# Patient Record
Sex: FEMALE | Race: WHITE | Employment: FULL TIME | URBAN - METROPOLITAN AREA
[De-identification: names, ages, dates, MRNs, and addresses within clinical notes are randomized per-mention and may not be internally consistent; named-entity substitution may affect disease eponyms.]

---

## 2019-08-12 LAB
CHLAMYDIA, EXTERNAL: NEGATIVE
HBSAG, EXTERNAL: NORMAL
HCT, EXTERNAL: 40.1
HGB, EXTERNAL: 13.8
HIV, EXTERNAL: NORMAL
N. GONORRHEA, EXTERNAL: NEGATIVE
PLATELET CNT,   EXTERNAL: NORMAL
RUBELLA, EXTERNAL: 1.04
T. PALLIDUM, EXTERNAL: NORMAL

## 2019-09-10 LAB — TYPE, ABO & RH, EXTERNAL: NORMAL

## 2019-12-23 LAB — ANTIBODY SCREEN, EXTERNAL: NEGATIVE

## 2020-02-24 LAB — GRBS, EXTERNAL: NEGATIVE

## 2020-03-16 ENCOUNTER — HOSPITAL ENCOUNTER (INPATIENT)
Age: 35
LOS: 3 days | Discharge: HOME | End: 2020-03-19
Attending: OBSTETRICS & GYNECOLOGY | Admitting: OBSTETRICS & GYNECOLOGY
Payer: COMMERCIAL

## 2020-03-16 PROBLEM — Z3A.39 39 WEEKS GESTATION OF PREGNANCY: Status: ACTIVE | Noted: 2020-03-16

## 2020-03-16 LAB
ALBUMIN SERPL-MCNC: 2.8 G/DL (ref 3.5–5)
ALBUMIN/GLOB SERPL: 0.9 {RATIO} (ref 1.1–2.2)
ALP SERPL-CCNC: 89 U/L (ref 45–117)
ALT SERPL-CCNC: 36 U/L (ref 12–78)
ANION GAP SERPL CALC-SCNC: 12 MMOL/L (ref 5–15)
AST SERPL-CCNC: 27 U/L (ref 15–37)
BILIRUB SERPL-MCNC: 0.4 MG/DL (ref 0.2–1)
BUN SERPL-MCNC: 11 MG/DL (ref 6–20)
BUN/CREAT SERPL: 21 (ref 12–20)
CALCIUM SERPL-MCNC: 9 MG/DL (ref 8.5–10.1)
CHLORIDE SERPL-SCNC: 107 MMOL/L (ref 97–108)
CO2 SERPL-SCNC: 20 MMOL/L (ref 21–32)
CREAT SERPL-MCNC: 0.53 MG/DL (ref 0.55–1.02)
ERYTHROCYTE [DISTWIDTH] IN BLOOD BY AUTOMATED COUNT: 12.6 % (ref 11.5–14.5)
GLOBULIN SER CALC-MCNC: 3 G/DL (ref 2–4)
GLUCOSE SERPL-MCNC: 85 MG/DL (ref 65–100)
HCT VFR BLD AUTO: 36 % (ref 35–47)
HGB BLD-MCNC: 12.3 G/DL (ref 11.5–16)
MCH RBC QN AUTO: 32.5 PG (ref 26–34)
MCHC RBC AUTO-ENTMCNC: 34.2 G/DL (ref 30–36.5)
MCV RBC AUTO: 95.2 FL (ref 80–99)
NRBC # BLD: 0 K/UL (ref 0–0.01)
NRBC BLD-RTO: 0 PER 100 WBC
PLATELET # BLD AUTO: 150 K/UL (ref 150–400)
PMV BLD AUTO: 11.1 FL (ref 8.9–12.9)
POTASSIUM SERPL-SCNC: 3.7 MMOL/L (ref 3.5–5.1)
PROT SERPL-MCNC: 5.8 G/DL (ref 6.4–8.2)
RBC # BLD AUTO: 3.78 M/UL (ref 3.8–5.2)
SODIUM SERPL-SCNC: 139 MMOL/L (ref 136–145)
WBC # BLD AUTO: 8.7 K/UL (ref 3.6–11)

## 2020-03-16 PROCEDURE — 74011250637 HC RX REV CODE- 250/637: Performed by: ADVANCED PRACTICE MIDWIFE

## 2020-03-16 PROCEDURE — 75410000002 HC LABOR FEE PER 1 HR

## 2020-03-16 PROCEDURE — 74011250637 HC RX REV CODE- 250/637: Performed by: OBSTETRICS & GYNECOLOGY

## 2020-03-16 PROCEDURE — 85027 COMPLETE CBC AUTOMATED: CPT

## 2020-03-16 PROCEDURE — 36415 COLL VENOUS BLD VENIPUNCTURE: CPT

## 2020-03-16 PROCEDURE — 3E033VJ INTRODUCTION OF OTHER HORMONE INTO PERIPHERAL VEIN, PERCUTANEOUS APPROACH: ICD-10-PCS | Performed by: ADVANCED PRACTICE MIDWIFE

## 2020-03-16 PROCEDURE — 65270000029 HC RM PRIVATE

## 2020-03-16 PROCEDURE — 80053 COMPREHEN METABOLIC PANEL: CPT

## 2020-03-16 RX ORDER — SODIUM CHLORIDE 0.9 % (FLUSH) 0.9 %
5-40 SYRINGE (ML) INJECTION EVERY 8 HOURS
Status: DISCONTINUED | OUTPATIENT
Start: 2020-03-16 | End: 2020-03-17

## 2020-03-16 RX ORDER — OXYTOCIN/0.9 % SODIUM CHLORIDE 30/500 ML
0-25 PLASTIC BAG, INJECTION (ML) INTRAVENOUS
Status: DISCONTINUED | OUTPATIENT
Start: 2020-03-17 | End: 2020-03-17

## 2020-03-16 RX ORDER — BUTORPHANOL TARTRATE 1 MG/ML
1 INJECTION INTRAMUSCULAR; INTRAVENOUS
Status: DISCONTINUED | OUTPATIENT
Start: 2020-03-16 | End: 2020-03-17

## 2020-03-16 RX ORDER — FENTANYL CITRATE 50 UG/ML
25 INJECTION, SOLUTION INTRAMUSCULAR; INTRAVENOUS
Status: DISCONTINUED | OUTPATIENT
Start: 2020-03-16 | End: 2020-03-17

## 2020-03-16 RX ORDER — TERBUTALINE SULFATE 1 MG/ML
0.25 INJECTION SUBCUTANEOUS AS NEEDED
Status: DISCONTINUED | OUTPATIENT
Start: 2020-03-16 | End: 2020-03-17

## 2020-03-16 RX ORDER — SODIUM CHLORIDE 0.9 % (FLUSH) 0.9 %
SYRINGE (ML) INJECTION
Status: COMPLETED
Start: 2020-03-16 | End: 2020-03-16

## 2020-03-16 RX ORDER — ZOLPIDEM TARTRATE 5 MG/1
5 TABLET ORAL
Status: COMPLETED | OUTPATIENT
Start: 2020-03-16 | End: 2020-03-16

## 2020-03-16 RX ORDER — SODIUM CHLORIDE 0.9 % (FLUSH) 0.9 %
5-40 SYRINGE (ML) INJECTION AS NEEDED
Status: DISCONTINUED | OUTPATIENT
Start: 2020-03-16 | End: 2020-03-17

## 2020-03-16 RX ADMIN — MISOPROSTOL 25 MCG: 100 TABLET ORAL at 17:01

## 2020-03-16 RX ADMIN — Medication 10 ML: at 17:44

## 2020-03-16 RX ADMIN — ZOLPIDEM TARTRATE 5 MG: 5 TABLET ORAL at 23:00

## 2020-03-16 RX ADMIN — Medication 10 ML: at 23:01

## 2020-03-16 NOTE — H&P
History & Physical    Name: Dane Flores MRN: [id number]  SSN: [id number]    YOB: 1985  Age: 37 y.o. Sex: female      Subjective:     Estimated Date of Delivery: None noted. OB History    Para Term  AB Living   1             SAB TAB Ectopic Molar Multiple Live Births                    # Outcome Date GA Lbr Moshe/2nd Weight Sex Delivery Anes PTL Lv   1 Current                Ms. Laly Orozco is admitted with pregnancy at 45 4/7 weeks for IOL due to [facility]. Prenatal course has otherwise been uncomplicated. Please see prenatal records for details. She denies HA, visual changes, RUQ pain, ctx, vb, lof. +FM. Past Medical History:   Diagnosis Date    Hypertension      Past Surgical History:   Procedure Laterality Date    HX HERNIA REPAIR       Social History     Occupational History    Not on file   Tobacco Use    Smoking status: Never Smoker   Substance and Sexual Activity    Alcohol use: No     Comment: socially    Drug use: No    Sexual activity: Not on file     No family history on file. No Known Allergies  Prior to Admission medications    Medication Sig Start Date End Date Taking? Authorizing Provider   losartan (COZAAR) 50 mg tablet Take 50 mg by mouth daily. Other, MD Armando        Review of Systems: A comprehensive review of systems was negative except for that written in the History of Present Illness. Objective:     Vitals:  Vitals:    20 1605   BP: (!) 133/98   Pulse: 72   Resp: 18   Temp: 98.6 °F (37 °C)   SpO2: 96%        Physical Exam:  Patient without distress.   Lung: normal respiratory effort  Abdomen: soft, nontender  Fundus: soft and non tender  Perineum: blood absent, amniotic fluid absent  Cervical Exam: Closed/60/-3  Membranes:  Intact  Fetal Heart Rate: Reactive  Baseline: 120 per minute  Variability: moderate  Accelerations: yes  Decelerations: none  Uterine contractions: none          Prenatal Labs:   No results found for: [address] Vel[address]quez, RUBELLAEXT, HBSAGEXT, HIVEXT, RPREXT, GONNOEXT, CHLAMEXT, ABORHEXT, RUBELLAEXT, GRBSEXT, HBSAGEXT, HIVEXT, RPREXT, GONNOEXT, CHLAMEXT    A+  GBS neg    Impression/Plan:     Active Problems:    * No active hospital problems. *     38 y/o  with IUP at 39 2/7 wks, IOL due to [facility]. GBS neg. Plan: Admit for induction of labor. Group B Strep negative. Cervix closed. Discussed recommendation for cytotec for ripening instead and possibility of prolonged IOL. Pt and  in agreement. 25 mcg cytotec was placed vaginally. Plan 25 mcg q4 hours.

## 2020-03-16 NOTE — PROGRESS NOTES
36: 32yo female presents accompanied by her partner for induction of labor. She received prenatal care under the service of [employer]. She is with a history of chronic hypertension but otherwise without significant past medical history. She presents as primigravida at 39+2/7 based on dating from [facility] office records. She has no complaints of pain or discomfort. Reports fetal movement consistent with quickening she has had in her third trimester of pregnancy. Denies rupture of membranes. Denies epigastric pain and vision change. On initial assessment, her skin is warm and dry. Her airway is grossly patent allowing for respirations that are even and unlabored. She was ambulatory to us here today and is alert and oriented to the situation. Skin assessment reveals no bruising, abrasions, or other unexplained abnormalities. Her blood pressure is at the upper-end of WDP but not concerning. She is placed on EFM. 20G IV placed in the left posterior forearm which flushes without issue. 1745: Graduated Compression Stockings applied per provider order. Saline lock flushed. Pt states she would like her  to bring her food this evening. No needs at this time    1900: Pt reports she is now feeling some mild lower vaginal pain. Reassuring tracing on the monitor     2215: Cervical ripening balloon placed  By [facility]. Tolerated well by pt. No obvious signs of complication at this time. Bedside US by CNM prior to procedure confirmed cephalic presentation. Pt tolerated procedure well. Plan is to induce labor at 0400 with pitocin. Will move forward with continuous monitoring until then.     2300: Pt provided with PRN zolpidem for sleep aid. Her and her partner plan to sleep afterward. IV flushed and saline locked. No further needs at this time. Lights dimmed and volume of EFM turned down. Will continue to monitor EFM at central monitoring.      2330: Verbal shift change report given to Susan GONZALEZ L&D(oncoming nurse) by Keith Purdy RN (offgoing nurse). Report included SBAR, Kardex, Intake/Output, MAR, Recent Results and Progress of Care. She accepts the patient to her care at this time. All questions answered.      END NOTE

## 2020-03-17 ENCOUNTER — ANESTHESIA EVENT (OUTPATIENT)
Dept: LABOR AND DELIVERY | Age: 35
End: 2020-03-17
Payer: COMMERCIAL

## 2020-03-17 ENCOUNTER — ANESTHESIA (OUTPATIENT)
Dept: LABOR AND DELIVERY | Age: 35
End: 2020-03-17
Payer: COMMERCIAL

## 2020-03-17 LAB
CREAT UR-MCNC: 19.3 MG/DL
PROT UR-MCNC: <5 MG/DL (ref 0–11.9)
PROT/CREAT UR-RTO: NORMAL

## 2020-03-17 PROCEDURE — 74011250636 HC RX REV CODE- 250/636: Performed by: ANESTHESIOLOGY

## 2020-03-17 PROCEDURE — 75410000000 HC DELIVERY VAGINAL/SINGLE

## 2020-03-17 PROCEDURE — 75410000002 HC LABOR FEE PER 1 HR

## 2020-03-17 PROCEDURE — 84156 ASSAY OF PROTEIN URINE: CPT

## 2020-03-17 PROCEDURE — 0KQM0ZZ REPAIR PERINEUM MUSCLE, OPEN APPROACH: ICD-10-PCS | Performed by: OBSTETRICS & GYNECOLOGY

## 2020-03-17 PROCEDURE — 75410000003 HC RECOV DEL/VAG/CSECN EA 0.5 HR

## 2020-03-17 PROCEDURE — 74011000250 HC RX REV CODE- 250: Performed by: ANESTHESIOLOGY

## 2020-03-17 PROCEDURE — 74011250637 HC RX REV CODE- 250/637: Performed by: OBSTETRICS & GYNECOLOGY

## 2020-03-17 PROCEDURE — 74011250636 HC RX REV CODE- 250/636: Performed by: ADVANCED PRACTICE MIDWIFE

## 2020-03-17 PROCEDURE — 74011250636 HC RX REV CODE- 250/636: Performed by: OBSTETRICS & GYNECOLOGY

## 2020-03-17 PROCEDURE — 65410000002 HC RM PRIVATE OB

## 2020-03-17 PROCEDURE — 77030031139 HC SUT VCRL2 J&J -A

## 2020-03-17 PROCEDURE — 76060000078 HC EPIDURAL ANESTHESIA

## 2020-03-17 PROCEDURE — 00HU33Z INSERTION OF INFUSION DEVICE INTO SPINAL CANAL, PERCUTANEOUS APPROACH: ICD-10-PCS | Performed by: ANESTHESIOLOGY

## 2020-03-17 RX ORDER — SIMETHICONE 80 MG
80 TABLET,CHEWABLE ORAL
Status: DISCONTINUED | OUTPATIENT
Start: 2020-03-17 | End: 2020-03-19 | Stop reason: HOSPADM

## 2020-03-17 RX ORDER — OXYTOCIN/RINGER'S LACTATE 20/1000 ML
999 PLASTIC BAG, INJECTION (ML) INTRAVENOUS AS NEEDED
Status: DISCONTINUED | OUTPATIENT
Start: 2020-03-17 | End: 2020-03-19 | Stop reason: HOSPADM

## 2020-03-17 RX ORDER — FENTANYL CITRATE 50 UG/ML
100 INJECTION, SOLUTION INTRAMUSCULAR; INTRAVENOUS ONCE
Status: COMPLETED | OUTPATIENT
Start: 2020-03-17 | End: 2020-03-17

## 2020-03-17 RX ORDER — BUPIVACAINE HYDROCHLORIDE 2.5 MG/ML
30 INJECTION, SOLUTION EPIDURAL; INFILTRATION; INTRACAUDAL ONCE
Status: DISCONTINUED | OUTPATIENT
Start: 2020-03-17 | End: 2020-03-17

## 2020-03-17 RX ORDER — NALOXONE HYDROCHLORIDE 0.4 MG/ML
0.4 INJECTION, SOLUTION INTRAMUSCULAR; INTRAVENOUS; SUBCUTANEOUS AS NEEDED
Status: DISCONTINUED | OUTPATIENT
Start: 2020-03-17 | End: 2020-03-17

## 2020-03-17 RX ORDER — IBUPROFEN 400 MG/1
800 TABLET ORAL EVERY 8 HOURS
Status: DISCONTINUED | OUTPATIENT
Start: 2020-03-17 | End: 2020-03-19 | Stop reason: HOSPADM

## 2020-03-17 RX ORDER — OXYTOCIN/RINGER'S LACTATE 20/1000 ML
125 PLASTIC BAG, INJECTION (ML) INTRAVENOUS AS NEEDED
Status: DISCONTINUED | OUTPATIENT
Start: 2020-03-17 | End: 2020-03-19 | Stop reason: HOSPADM

## 2020-03-17 RX ORDER — SODIUM CHLORIDE 0.9 % (FLUSH) 0.9 %
5-40 SYRINGE (ML) INJECTION AS NEEDED
Status: DISCONTINUED | OUTPATIENT
Start: 2020-03-17 | End: 2020-03-19 | Stop reason: HOSPADM

## 2020-03-17 RX ORDER — HYDROCORTISONE ACETATE PRAMOXINE HCL 2.5; 1 G/100G; G/100G
CREAM TOPICAL AS NEEDED
Status: DISCONTINUED | OUTPATIENT
Start: 2020-03-17 | End: 2020-03-19 | Stop reason: HOSPADM

## 2020-03-17 RX ORDER — SODIUM CHLORIDE, SODIUM LACTATE, POTASSIUM CHLORIDE, CALCIUM CHLORIDE 600; 310; 30; 20 MG/100ML; MG/100ML; MG/100ML; MG/100ML
125 INJECTION, SOLUTION INTRAVENOUS CONTINUOUS
Status: DISCONTINUED | OUTPATIENT
Start: 2020-03-17 | End: 2020-03-17

## 2020-03-17 RX ORDER — ZOLPIDEM TARTRATE 5 MG/1
5 TABLET ORAL
Status: DISCONTINUED | OUTPATIENT
Start: 2020-03-17 | End: 2020-03-19 | Stop reason: HOSPADM

## 2020-03-17 RX ORDER — AMMONIA 15 % (W/V)
1 AMPUL (EA) INHALATION AS NEEDED
Status: DISCONTINUED | OUTPATIENT
Start: 2020-03-17 | End: 2020-03-19 | Stop reason: HOSPADM

## 2020-03-17 RX ORDER — ACETAMINOPHEN 325 MG/1
650 TABLET ORAL
Status: DISCONTINUED | OUTPATIENT
Start: 2020-03-17 | End: 2020-03-19 | Stop reason: HOSPADM

## 2020-03-17 RX ORDER — FENTANYL CITRATE 50 UG/ML
INJECTION, SOLUTION INTRAMUSCULAR; INTRAVENOUS AS NEEDED
Status: DISCONTINUED | OUTPATIENT
Start: 2020-03-17 | End: 2020-03-17 | Stop reason: HOSPADM

## 2020-03-17 RX ORDER — BUPIVACAINE HYDROCHLORIDE 2.5 MG/ML
INJECTION, SOLUTION EPIDURAL; INFILTRATION; INTRACAUDAL AS NEEDED
Status: DISCONTINUED | OUTPATIENT
Start: 2020-03-17 | End: 2020-03-17 | Stop reason: HOSPADM

## 2020-03-17 RX ORDER — DOCUSATE SODIUM 100 MG/1
100 CAPSULE, LIQUID FILLED ORAL
Status: DISCONTINUED | OUTPATIENT
Start: 2020-03-17 | End: 2020-03-19 | Stop reason: HOSPADM

## 2020-03-17 RX ORDER — OXYCODONE AND ACETAMINOPHEN 5; 325 MG/1; MG/1
1 TABLET ORAL
Status: DISCONTINUED | OUTPATIENT
Start: 2020-03-17 | End: 2020-03-19 | Stop reason: HOSPADM

## 2020-03-17 RX ORDER — FENTANYL/BUPIVACAINE/NS/PF 2-1250MCG
10 PREFILLED PUMP RESERVOIR EPIDURAL CONTINUOUS
Status: DISCONTINUED | OUTPATIENT
Start: 2020-03-17 | End: 2020-03-17

## 2020-03-17 RX ORDER — EPHEDRINE SULFATE/0.9% NACL/PF 50 MG/5 ML
10 SYRINGE (ML) INTRAVENOUS
Status: DISCONTINUED | OUTPATIENT
Start: 2020-03-17 | End: 2020-03-17

## 2020-03-17 RX ORDER — HYDROCORTISONE 1 %
CREAM (GRAM) TOPICAL AS NEEDED
Status: DISCONTINUED | OUTPATIENT
Start: 2020-03-17 | End: 2020-03-19 | Stop reason: HOSPADM

## 2020-03-17 RX ORDER — SODIUM CHLORIDE 0.9 % (FLUSH) 0.9 %
5-40 SYRINGE (ML) INJECTION EVERY 8 HOURS
Status: DISCONTINUED | OUTPATIENT
Start: 2020-03-17 | End: 2020-03-19 | Stop reason: HOSPADM

## 2020-03-17 RX ADMIN — SODIUM CHLORIDE, SODIUM LACTATE, POTASSIUM CHLORIDE, AND CALCIUM CHLORIDE 125 ML/HR: 600; 310; 30; 20 INJECTION, SOLUTION INTRAVENOUS at 04:39

## 2020-03-17 RX ADMIN — BUTORPHANOL TARTRATE 1 MG: 1 INJECTION, SOLUTION INTRAMUSCULAR; INTRAVENOUS at 12:41

## 2020-03-17 RX ADMIN — FENTANYL CITRATE 100 MCG: 50 INJECTION INTRAMUSCULAR; INTRAVENOUS at 14:27

## 2020-03-17 RX ADMIN — BUPIVACAINE HYDROCHLORIDE 5 ML: 2.5 INJECTION, SOLUTION EPIDURAL; INFILTRATION; INTRACAUDAL; PERINEURAL at 14:12

## 2020-03-17 RX ADMIN — BUTORPHANOL TARTRATE 1 MG: 1 INJECTION, SOLUTION INTRAMUSCULAR; INTRAVENOUS at 01:17

## 2020-03-17 RX ADMIN — BUPIVACAINE HYDROCHLORIDE 5 ML: 2.5 INJECTION, SOLUTION EPIDURAL; INFILTRATION; INTRACAUDAL; PERINEURAL at 14:14

## 2020-03-17 RX ADMIN — FENTANYL CITRATE 100 MCG: 50 INJECTION, SOLUTION INTRAMUSCULAR; INTRAVENOUS at 14:14

## 2020-03-17 RX ADMIN — BUTORPHANOL TARTRATE 1 MG: 1 INJECTION, SOLUTION INTRAMUSCULAR; INTRAVENOUS at 08:53

## 2020-03-17 RX ADMIN — IBUPROFEN 800 MG: 400 TABLET, FILM COATED ORAL at 21:26

## 2020-03-17 RX ADMIN — Medication 10 ML/HR: at 14:21

## 2020-03-17 RX ADMIN — OXYTOCIN-SODIUM CHLORIDE 0.9% IV SOLN 30 UNIT/500ML 1 MILLI-UNITS/MIN: 30-0.9/5 SOLUTION at 04:41

## 2020-03-17 NOTE — PROGRESS NOTES
[id number] Lunch coverage and transfer of care to ROB Thomson RN after [facility] [employer].

## 2020-03-17 NOTE — ANESTHESIA PREPROCEDURE EVALUATION
Relevant Problems   No relevant active problems       Anesthetic History   No history of anesthetic complications            Review of Systems / Medical History  Patient summary reviewed, nursing notes reviewed and pertinent labs reviewed    Pulmonary  Within defined limits                 Neuro/Psych   Within defined limits           Cardiovascular    Hypertension                   GI/Hepatic/Renal  Within defined limits              Endo/Other  Within defined limits           Other Findings              Physical Exam    Airway  Mallampati: II  TM Distance: > 6 cm  Neck ROM: normal range of motion   Mouth opening: Normal     Cardiovascular  Regular rate and rhythm,  S1 and S2 normal,  no murmur, click, rub, or gallop             Dental  No notable dental hx       Pulmonary  Breath sounds clear to auscultation               Abdominal  GI exam deferred       Other Findings            Anesthetic Plan    ASA: 2  Anesthesia type: epidural            Anesthetic plan and risks discussed with: Patient

## 2020-03-17 NOTE — PROGRESS NOTES
Problem: Patient Education: Go to Patient Education Activity  Goal: Patient/Family Education  Outcome: Progressing Towards Goal     Problem: Vaginal Delivery: Day of Deliver-Laboring  Goal: Off Pathway (Use only if patient is Off Pathway)  Outcome: Progressing Towards Goal  Goal: Activity/Safety  Outcome: Progressing Towards Goal  Goal: Consults, if ordered  Outcome: Progressing Towards Goal  Goal: Diagnostic Test/Procedures  Outcome: Progressing Towards Goal  Goal: Nutrition/Diet  Outcome: Progressing Towards Goal  Goal: Discharge Planning  Outcome: Progressing Towards Goal  Goal: Medications  Outcome: Progressing Towards Goal  Goal: Respiratory  Outcome: Progressing Towards Goal  Goal: Treatments/Interventions/Procedures  Outcome: Progressing Towards Goal  Goal: *Vital signs within defined limits  Outcome: Progressing Towards Goal  Goal: *Labs within defined limits  Outcome: Progressing Towards Goal  Goal: *Hemodynamically stable  Outcome: Progressing Towards Goal  Goal: *Optimal pain control at patient's stated goal  Outcome: Progressing Towards Goal     Problem: Vaginal Delivery: Day of Delivery-Post delivery  Goal: Off Pathway (Use only if patient is Off Pathway)  Outcome: Progressing Towards Goal  Goal: Activity/Safety  Outcome: Progressing Towards Goal  Goal: Consults, if ordered  Outcome: Progressing Towards Goal  Goal: Nutrition/Diet  Outcome: Progressing Towards Goal  Goal: Discharge Planning  Outcome: Progressing Towards Goal  Goal: Medications  Outcome: Progressing Towards Goal  Goal: Treatments/Interventions/Procedures  Outcome: Progressing Towards Goal  Goal: *Vital signs within defined limits  Outcome: Progressing Towards Goal  Goal: *Labs within defined limits  Outcome: Progressing Towards Goal  Goal: *Hemodynamically stable  Outcome: Progressing Towards Goal  Goal: *Optimal pain control at patient's stated goal  Outcome: Progressing Towards Goal  Goal: *Participates in infant care  Outcome: Progressing Towards Goal  Goal: *Demonstrates progressive activity  Outcome: Progressing Towards Goal  Goal: *Tolerating diet  Outcome: Progressing Towards Goal     Problem: Vaginal Delivery: Postpartum Day 1  Goal: Off Pathway (Use only if patient is Off Pathway)  Outcome: Progressing Towards Goal  Goal: Activity/Safety  Outcome: Progressing Towards Goal  Goal: Consults, if ordered  Outcome: Progressing Towards Goal  Goal: Diagnostic Test/Procedures  Outcome: Progressing Towards Goal  Goal: Nutrition/Diet  Outcome: Progressing Towards Goal  Goal: Discharge Planning  Outcome: Progressing Towards Goal  Goal: Medications  Outcome: Progressing Towards Goal  Goal: Treatments/Interventions/Procedures  Outcome: Progressing Towards Goal  Goal: Psychosocial  Outcome: Progressing Towards Goal  Goal: *Vital signs within defined limits  Outcome: Progressing Towards Goal  Goal: *Labs within defined limits  Outcome: Progressing Towards Goal  Goal: *Hemodynamically stable  Outcome: Progressing Towards Goal  Goal: *Optimal pain control at patient's stated goal  Outcome: Progressing Towards Goal  Goal: *Participates in infant care  Outcome: Progressing Towards Goal  Goal: *Demonstrates progressive activity  Outcome: Progressing Towards Goal  Goal: *Performs self perineal care  Outcome: Progressing Towards Goal  Goal: *Appropriate parent-infant bonding  Outcome: Progressing Towards Goal  Goal: *Tolerating diet  Outcome: Progressing Towards Goal  Goal: *Performs self breast care  Outcome: Progressing Towards Goal     Problem: Vaginal Delivery: Postpartum 2  Goal: Off Pathway (Use only if patient is Off Pathway)  Outcome: Progressing Towards Goal  Goal: Activity/Safety  Outcome: Progressing Towards Goal  Goal: Consults, if ordered  Outcome: Progressing Towards Goal  Goal: Nutrition/Diet  Outcome: Progressing Towards Goal  Goal: Discharge Planning  Outcome: Progressing Towards Goal  Goal: Medications  Outcome: Progressing Towards Goal  Goal: Treatments/Interventions/Procedures  Outcome: Progressing Towards Goal  Goal: Psychosocial  Outcome: Progressing Towards Goal     Problem: Vaginal Delivery: Discharge Outcomes  Goal: *Verbalizes name, dosage, time, side effects, and number of days to continue medications  Outcome: Progressing Towards Goal  Goal: *Describes available resources and support systems  Outcome: Progressing Towards Goal  Goal: *No signs and symptoms of infection  Outcome: Progressing Towards Goal  Goal: *Birth certificate information completed  Outcome: Progressing Towards Goal  Goal: *Received and verbalizes understanding of discharge plan and instructions  Outcome: Progressing Towards Goal  Goal: *Vital signs within defined limits  Outcome: Progressing Towards Goal  Goal: *Labs within defined limits  Outcome: Progressing Towards Goal  Goal: *Hemodynamically stable  Outcome: Progressing Towards Goal  Goal: *Optimal pain control at patient's stated goal  Outcome: Progressing Towards Goal  Goal: *Participates in infant care  Outcome: Progressing Towards Goal  Goal: *Demonstrates progressive activity  Outcome: Progressing Towards Goal  Goal: *Appropriate parent-infant bonding  Outcome: Progressing Towards Goal  Goal: *Tolerating diet  Outcome: Progressing Towards Goal     Problem: Falls - Risk of  Goal: *Absence of Falls  Description: Document Jerome Fall Risk and appropriate interventions in the flowsheet.   Outcome: Progressing Towards Goal  Note: Fall Risk Interventions:            Medication Interventions: Patient to call before getting OOB                   Problem: Patient Education: Go to Patient Education Activity  Goal: Patient/Family Education  Outcome: Progressing Towards Goal

## 2020-03-17 NOTE — PROGRESS NOTES
In room to see patient. Feeling some CTX. Friedman still in. Visit Vitals  BP (!) 129/98   Pulse 71   Temp 98.6 °F (37 °C)   Resp 16   Ht 5' 4\" (1.626 m) Comment: per pt   Wt 81.6 kg (180 lb) Comment: per pt   SpO2 99%   BMI 30.90 kg/m²     SVE: 2+cm with friedman still in place. EFM: Cat 1  Daph[place]: q2 mins    A/P: Pt is a 36 yo G1 at 39+3 for IOL for CHTN. Mild range BPs -- will send P:C  Reassess 10 am for friedman -- consider traction at that time. AROM when feasible. Epidural PRN. Anticipate .

## 2020-03-17 NOTE — PROGRESS NOTES
Bedside shift change report given to ROB Herman RN (oncoming nurse) by Butch Bansal RN (offgoing nurse). Report included the following information SBAR, Kardex, Intake/Output and MAR. 1045 Dr Yamel Horowitz in to assess pt progress, friedman balloon close to coming out, extra traction put on balloon at this time. 1838 pt began pushing at this time, nurse continuously at bedside. Fetal heart tracing reassuring and reactive. 1900 nurse continuously at bedside. Fetal heart tracing reassuring and reactive. 1930 nurse continuously at bedside. Fetal heart tracing reassuring and reactive. 1940 Bedside shift change report given to JUNIOR Benavides (oncoming nurse) by ROB Zarate RN (offgoing nurse). Report included the following information SBAR, Kardex and MAR.

## 2020-03-17 NOTE — ANESTHESIA PROCEDURE NOTES
Epidural Block    Start time: 3/17/2020 2:04 PM  End time: 3/17/2020 2:15 PM  Performed by: Aroldo Littlejohn MD  Authorized by: Aroldo Littlejohn MD     Pre-Procedure  Indication: labor epidural    Preanesthetic Checklist: patient identified, risks and benefits discussed, anesthesia consent, site marked, patient being monitored, timeout performed and anesthesia consent    Timeout Time: 14:04        Epidural:   Patient position:  Seated  Prep region:  Lumbar  Prep: Betadine and Patient draped    Location:  L2-3    Needle and Epidural Catheter:   Needle Type:  Tuohy  Needle Gauge:  17 G  Injection Technique:  Loss of resistance using air  Attempts:  1  Catheter Size:  19 G  Events: no blood with aspiration, no cerebrospinal fluid with aspiration, no paresthesia and negative aspiration test    Test Dose:  Bupivacaine 0.25% and negative    Assessment:   Catheter Secured:  Tegaderm and tape  Insertion:  Uncomplicated  Patient tolerance:  Patient tolerated the procedure well with no immediate complications

## 2020-03-17 NOTE — PROGRESS NOTES
In room to see patient. FB just came out. Visit Vitals  BP (!) 138/93   Pulse 73   Temp 98.3 °F (36.8 °C)   Resp 16   Ht 5' 4\" (1.626 m) Comment: per pt   Wt 81.6 kg (180 lb) Comment: per pt   SpO2 99%   BMI 30.90 kg/m²     SVE: 5-6/60/-3, AROM clear fluid. EFM: Cat 1  [place]: q2-3 mins    A/P: Pt is a 36 yo G1 at 39+3 wga for IOL for CHTN  BPs mild range, P:C pending, pre-e labs WNL otherwise. Epidural PRN  Anticipate .

## 2020-03-17 NOTE — PROGRESS NOTES
Labor Progress Note    S: Patient seen, fetal heart rate and contraction pattern evaluated. Resting in bed. Feeling some mild cramping. Partner at bedside.     Physical Exam:  Patient Vitals for the past 4 hrs:   Temp Pulse Resp BP SpO2   20 98.6 °F (37 °C) 78 18 141/89 99 %         Cervical Exam: fingertip/70/-3, vtx confirmed by bedside sono  Cook balloon placed 60mL (uterine), 60mL (vaginal)  Membranes:  Intact  Uterine Contractions:  Frequency: Irregular q1-6, mild, not all felt by patient  Fetal Heart Rate: Reactive, 135s, +accels, neg decels, moderate variability      Assessment/Plan:    37 y.o.  at 39w2d IUP  IOL CHTN  S/p cytotec x1 dose  Cat 1 tracing  GBS negative    P:  Assumed care of patient at   Introduced self to patient and partner  Reviewed cook balloon with patient and partner including r/b/a and agree to proceed  Continuous monitoring  Start pitocin at 0400  All questions asked/answered and patient agrees with plan    Sylvia Foley CNM

## 2020-03-18 PROCEDURE — 74011250637 HC RX REV CODE- 250/637: Performed by: OBSTETRICS & GYNECOLOGY

## 2020-03-18 PROCEDURE — 65410000002 HC RM PRIVATE OB

## 2020-03-18 RX ORDER — IBUPROFEN 600 MG/1
600 TABLET ORAL
Qty: 40 TAB | Refills: 0 | Status: SHIPPED | OUTPATIENT
Start: 2020-03-18 | End: 2021-09-30

## 2020-03-18 RX ADMIN — ACETAMINOPHEN 650 MG: 325 TABLET ORAL at 02:22

## 2020-03-18 RX ADMIN — ACETAMINOPHEN 650 MG: 325 TABLET ORAL at 09:45

## 2020-03-18 RX ADMIN — DOCUSATE SODIUM 100 MG: 100 CAPSULE, LIQUID FILLED ORAL at 02:22

## 2020-03-18 RX ADMIN — IBUPROFEN 800 MG: 400 TABLET, FILM COATED ORAL at 14:59

## 2020-03-18 RX ADMIN — IBUPROFEN 800 MG: 400 TABLET, FILM COATED ORAL at 06:07

## 2020-03-18 NOTE — PROGRESS NOTES
Post-Partum Day Number 1 Progress Note    Princess Santillan     Assessment: Doing well, post partum day 1    Plan:  1. Continue routine postpartum and perineal care as well as maternal education. 2. CHTN: normotensive, no meds  3. Female , doing well    Information for the patient's :  Michelle Poster [[id number]]   Vaginal, Spontaneous     Subjective:  Patient doing well without significant complaint. Voiding without difficulty, normal lochia. Vitals:  Visit Vitals  /89 (BP 1 Location: Right arm, BP Patient Position: At rest)   Pulse 94   Temp 98.7 °F (37.1 °C)   Resp 16   Ht 5' 4\" (1.626 m) Comment: per pt   Wt 81.6 kg (180 lb) Comment: per pt   SpO2 98%   Breastfeeding Unknown   BMI 30.90 kg/m²     Temp (24hrs), Av.5 °F (36.9 °C), Min:98 °F (36.7 °C), Max:99.1 °F (37.3 °C)        Exam:   Patient without distress. Abdomen soft, fundus firm, nontender                Perineum with normal lochia noted. Lower extremities are negative for swelling, cords or tenderness. Labs:     Lab Results   Component Value Date/Time    WBC 8.7 2020 04:43 PM    WBC 6.2 2014 11:15 AM    WBC 7.4 2011 06:50 PM    HGB 12.3 2020 04:43 PM    HGB 14.2 2014 11:15 AM    HGB 14.1 2011 06:50 PM    HCT 36.0 2020 04:43 PM    HCT 41.2 2014 11:15 AM    HCT 40.4 2011 06:50 PM    PLATELET 833  04:43 PM    PLATELET 623  11:15 AM    PLATELET 917  06:50 PM    Hgb, External 13.8 2019    Hct, External 40.1 2019    Platelet cnt., External 211,000 2019       No results found for this or any previous visit (from the past 24 hour(s)).

## 2020-03-18 NOTE — DISCHARGE SUMMARY
Obstetrical Discharge Summary     Name: Antonio García MRN: [id number]  SSN: [id number]    YOB: 1985  Age: 37 y.o. Sex: female      Admit Date: 3/16/2020    Discharge Date: 3/19/2020     Admitting Physician: Nadine Mcrae MD     Attending Physician:  Edna Leiva MD     Admission Diagnoses: 39 weeks gestation of pregnancy [Z3A.39]    Discharge Diagnoses:   Information for the patient's :  Drake Dorman [[id number]]   Delivery of a 3.495 kg female infant via Vaginal, Spontaneous on 3/17/2020 at 7:38 PM  by Ligia Nance. Apgars were 9  and 9 . Additional Diagnoses:   Hospital Problems  Never Reviewed          Codes Class Noted POA    39 weeks gestation of pregnancy ICD-10-CM: Z3A.39  ICD-9-CM: V22.2  3/16/2020 Unknown             Lab Results   Component Value Date/Time    Rubella, External 1.04 2019    GrBStrep, External Negative 2020       Hospital Course: Normal hospital course following the delivery. Disposition at Discharge: Home or self care    Discharged Condition: Stable    Patient Instructions:   Current Discharge Medication List      START taking these medications    Details   ibuprofen (MOTRIN) 600 mg tablet Take 1 Tab by mouth every six (6) hours as needed for Pain. Qty: 40 Tab, Refills: 0         STOP taking these medications       losartan (COZAAR) 50 mg tablet Comments:   Reason for Stopping:               Reference my discharge instructions.     Follow-up Appointments   Procedures    FOLLOW UP VISIT Appointment in: 6 Weeks Please also check BP in one week     Please also check BP in one week     Standing Status:   Standing     Number of Occurrences:   1     Order Specific Question:   Appointment in     Answer:   6 Weeks        Signed By:  Nikita Borja MD     2020

## 2020-03-18 NOTE — ADVANCED PRACTICE NURSE
TRANSFER - IN REPORT:    Verbal report received from Rosetta RN(name) on Poppy Grover  being received from L&D(unit) for routine progression of care      Report consisted of patients Situation, Background, Assessment and   Recommendations(SBAR). Information from the following report(s) SBAR, Kardex, Procedure Summary, Intake/Output, MAR and Recent Results was reviewed with the receiving nurse. Opportunity for questions and clarification was provided. Assessment completed upon patients arrival to unit and care assumed.

## 2020-03-18 NOTE — PROGRESS NOTES
Delivery Summary  Patient: Ruperto Ulloa             Circumcision:   NA-female  Additional Delivery Comments - IOL for CHTN, uncomplicated . 2nd degree laceration repaired without issue, left labial not repaired. Information for the patient's :  Paul Mobley [[id number]]     Delivery Type: Vaginal, Spontaneous   Delivery Date: 3/17/2020   Delivery Time: 7:38 PM     Birth Weight:       Sex:  female  Delivery Clinician:  Keith Block   Gestational Age: 38w3d    Presentation: Vertex   Position:             Apgars were 9  and 9      Resuscitation Method: None     Meconium Stained: None    Living Status: Living       Placenta Date/Time: 3/17/2020  7:43 PM   Placenta Removal: Spontaneous   Placenta Appearance: Normal    Cord Information: 3 Vessels    Cord Events: Nuchal Cord Without Compressions       Disposition of Cord Blood: Discard    Blood Gases Sent?:  No       Cord pH:  none    Episiotomy:     Laceration(s):     2nd degree  Estimated Blood Loss (ml): 350mL    Labor Events  Method: Oxytocin      Augmentation:     Cervical Ripening:        [place]/EASI        Operative Vaginal Delivery - none

## 2020-03-18 NOTE — DISCHARGE INSTRUCTIONS
Patient Education        Vaginal Childbirth: Care Instructions  Your Care Instructions    Your body will slowly heal in the next few weeks. It is easy to get too tired and overwhelmed during the first weeks after your baby is born. Changes in your hormones can shift your mood without warning. You may find it hard to meet the extra demands on your energy and time. Take it easy on yourself. Follow-up care is a key part of your treatment and safety. Be sure to make and go to all appointments, and call your doctor if you are having problems. It's also a good idea to know your test results and keep a list of the medicines you take. How can you care for yourself at home? · Vaginal bleeding and cramps  ? After delivery, you will have a bloody discharge from the vagina. This will turn pink within a week and then white or yellow after about 10 days. It may last for 2 to 4 weeks or longer, until the uterus has healed. Use pads instead of tampons until you stop bleeding. ? Do not worry if you pass some blood clots, as long as they are smaller than a golf ball. If you have a tear or stitches in your vaginal area, change the pad at least every 4 hours to prevent soreness and infection. ? You may have cramps for the first few days after childbirth. These are normal and occur as the uterus shrinks to normal size. Take an over-the-counter pain medicine, such as acetaminophen (Tylenol), ibuprofen (Advil, Motrin), or naproxen (Aleve), for cramps. Read and follow all instructions on the label. Do not take aspirin, because it can cause more bleeding. ? Do not take two or more pain medicines at the same time unless the doctor told you to. Many pain medicines have acetaminophen, which is Tylenol. Too much acetaminophen (Tylenol) can be harmful. · Stitches  ? If you have stitches, they will dissolve on their own and do not need to be removed. Follow your doctor's instructions for cleaning the stitched area.   ? Put ice or a cold pack on your painful area for 10 to 20 minutes at a time, several times a day, for the first few days. Put a thin cloth between the ice and your skin. ? Sit in a few inches of warm water (sitz bath) 3 times a day and after bowel movements. The warm water helps with pain and itching. If you do not have a tub, a warm shower might help. · Breast fullness  ? Your breasts may overfill (engorge) in the first few days after delivery. To help milk flow and to relieve pain, warm your breasts in the shower or by using warm, moist towels before nursing. ? If you are not nursing, do not put warmth on your breasts or touch your breasts. Wear a tight bra or sports bra and use ice until the fullness goes away. This usually takes 2 to 3 days. ? Put ice or a cold pack on your breast after nursing to reduce swelling and pain. Put a thin cloth between the ice and your skin. · Activity  ? Eat a balanced diet. Do not try to lose weight by cutting calories. Keep taking your prenatal vitamins, or take a multivitamin. ? Get as much rest as you can. Try to take naps when your baby sleeps during the day. ? Get some exercise every day. But do not do any heavy exercise until your doctor says it is okay. ? Wait until you are healed (about 4 to 6 weeks) before you have sexual intercourse. Your doctor will tell you when it is okay to have sex. ? Talk to your doctor about birth control. You can get pregnant even before your period returns. Also, you can get pregnant while you are breastfeeding. · Mental health  ? It is normal to have some sadness, anxiety, sleeplessness, and mood swings after you go home. If you feel upset or hopeless for more than a few days or are having trouble doing the things you need to do, talk to your doctor. · Constipation and hemorrhoids  ? Drink plenty of fluids, enough so that your urine is light yellow or clear like water.  If you have kidney, heart, or liver disease and have to limit fluids, talk with your doctor before you increase the amount of fluids you drink. ? Eat plenty of fiber each day. Have a bran muffin or bran cereal for breakfast, and try eating a piece of fruit for a mid-afternoon snack. ? For painful, itchy hemorrhoids, put ice or a cold pack on the area several times a day for 10 minutes at a time. Follow this by putting a warm compress on the area for another 10 to 20 minutes or by sitting in a shallow, warm bath. When should you call for help? Call  911 anytime you think you may need emergency care. For example, call if:    · You have thoughts of harming yourself, your baby, or another person.     · You passed out (lost consciousness).     · You have chest pain, are short of breath, or cough up blood.     · You have a seizure.    Call your doctor now or seek immediate medical care if:    · You have severe vaginal bleeding.     · You are dizzy or lightheaded, or you feel like you may faint.     · You have a fever.     · You have new or more pain in your belly or pelvis.     · You have symptoms of a blood clot in your leg (called a deep vein thrombosis), such as:  ? Pain in the calf, back of the knee, thigh, or groin. ? Redness and swelling in your leg or groin.     · You have signs of preeclampsia, such as:  ? Sudden swelling of your face, hands, or feet. ? New vision problems (such as dimness, blurring, or seeing spots). ? A severe headache.    Watch closely for changes in your health, and be sure to contact your doctor if:    · Your vaginal bleeding seems to be getting heavier.     · You have new or worse vaginal discharge.     · You feel sad, anxious, or hopeless for more than a few days.     · You do not get better as expected. Where can you learn more? Go to http://jo-ann-mann.info/  Enter Q237 in the search box to learn more about \"Vaginal Childbirth: Care Instructions. \"  Current as of: May 29, 2019Content Version: 12.4  © 1560-1660 [employer].   Care instructions adapted under license by [employer] (which disclaims liability or warranty for this information). If you have questions about a medical condition or this instruction, always ask your healthcare professional. [address] any warranty or liability for your use of this information.

## 2020-03-18 NOTE — LACTATION NOTE
This note was copied from a baby's chart. Baby nursing well today with use of nipple shield to inverted nipples,  deep latch obtained, mother is comfortable, baby feeding vigorously with rhythmic suck, swallow, breathe pattern, audible swallowing, and evident milk transfer, both breasts offered, baby is asleep following feeding. Infant was fussy at beginning of consult, once baby got a taste, she settled and nursed well. Mother given hand pump to aid with pulling out nipples.

## 2020-03-18 NOTE — PROGRESS NOTES
Bedside and Verbal shift change report given to Jo-Ann Sheridan RN (oncoming nurse) by Eliud Lake RN (offgoing nurse). Report included the following information SBAR, Kardex, Intake/Output, MAR and Recent Results. :   vigorous female  placed on mom's chest.   :  TRANSFER - OUT REPORT:    Verbal report given to JOSAFAT Gipson RN(name) on [place]  being transferred to MIU(unit) for ordered procedure       Report consisted of patients Situation, Background, Assessment and   Recommendations(SBAR). Information from the following report(s) SBAR, Kardex, Intake/Output, MAR and Recent Results was reviewed with the receiving nurse. Lines:   Peripheral IV 20 Anterior; Left Forearm (Active)   Site Assessment Clean, dry, & intact 3/17/2020  7:58 PM   Phlebitis Assessment 0 3/17/2020  7:58 PM   Infiltration Assessment 0 3/17/2020  7:58 PM   Dressing Status Clean, dry, & intact 3/17/2020  7:58 PM   Dressing Type Tape;Transparent 3/17/2020  7:58 PM   Hub Color/Line Status Pink; Infusing 3/17/2020  7:58 PM   Alcohol Cap Used Yes 3/16/2020 11:11 PM        Opportunity for questions and clarification was provided.       Patient transported with:   Registered Nurse

## 2020-03-19 VITALS
SYSTOLIC BLOOD PRESSURE: 137 MMHG | WEIGHT: 180 LBS | HEIGHT: 64 IN | DIASTOLIC BLOOD PRESSURE: 86 MMHG | RESPIRATION RATE: 16 BRPM | TEMPERATURE: 97.7 F | HEART RATE: 84 BPM | BODY MASS INDEX: 30.73 KG/M2 | OXYGEN SATURATION: 98 %

## 2020-03-19 PROCEDURE — 74011250637 HC RX REV CODE- 250/637: Performed by: OBSTETRICS & GYNECOLOGY

## 2020-03-19 RX ADMIN — ACETAMINOPHEN 650 MG: 325 TABLET ORAL at 10:37

## 2020-03-19 RX ADMIN — ACETAMINOPHEN 650 MG: 325 TABLET ORAL at 05:07

## 2020-03-19 RX ADMIN — DOCUSATE SODIUM 100 MG: 100 CAPSULE, LIQUID FILLED ORAL at 05:07

## 2020-03-19 NOTE — PROGRESS NOTES
Bedside and Verbal shift change report given to DEXTER Bowles RN (oncoming nurse) by Matthew otriz.  Report included the following information SBAR, Kardex, Intake/Output, MAR and Recent Results.

## 2020-03-19 NOTE — PROGRESS NOTES
Post-Partum Day Number 2 Progress Note    Earle Santillan     Assessment: Doing well, post partum day 2    Plan:   1. Discharge home today  2. Follow up in office in 6 weeks with Edna Leiva MD  3. Post partum activity advised, diet as tolerated  4. Discharge Medications: pain medicines as taken in hospital, and medications prior to admission    CHTN- normal to mild range BPs  - pt will check BPs at home - aware to call for . 160/110    Information for the patient's :  Drake Dorman [[id number]]   Vaginal, Spontaneous   Patient doing well without significant complaint. Voiding without difficulty, normal lochia. Vitals:  Visit Vitals  /90 (BP 1 Location: Right arm, BP Patient Position: At rest)   Pulse 73   Temp 98.3 °F (36.8 °C)   Resp 16   Ht 5' 4\" (1.626 m) Comment: per pt   Wt 81.6 kg (180 lb) Comment: per pt   SpO2 98%   Breastfeeding Unknown   BMI 30.90 kg/m²     Temp (24hrs), Av.4 °F (36.9 °C), Min:98.1 °F (36.7 °C), Max:98.8 °F (37.1 °C)      Exam:         Patient without distress. Abdomen soft, fundus firm, nontender                 Lower extremities are symmetric without tenderness, cords or erythema. Labs:     Lab Results   Component Value Date/Time    WBC 8.7 2020 04:43 PM    WBC 6.2 2014 11:15 AM    WBC 7.4 2011 06:50 PM    HGB 12.3 2020 04:43 PM    HGB 14.2 2014 11:15 AM    HGB 14.1 2011 06:50 PM    HCT 36.0 2020 04:43 PM    HCT 41.2 2014 11:15 AM    HCT 40.4 2011 06:50 PM    PLATELET 578  04:43 PM    PLATELET 155  11:15 AM    PLATELET 042  06:50 PM    Hgb, External 13.8 2019    Hct, External 40.1 2019    Platelet cnt., External 211,000 2019       No results found for this or any previous visit (from the past 24 hour(s)).

## 2020-03-19 NOTE — ROUTINE PROCESS
Bedside shift change report given to A Stephen RN (oncoming nurse) by Malathi Morrow RN (offgoing nurse). Report included the following information SBAR.   0913- Discussed with the patient and all questioned fully answered. Esign pad not working, Patient has received and understands all discharge instructions.

## 2021-02-04 LAB
CHLAMYDIA, EXTERNAL: NEGATIVE
N. GONORRHEA, EXTERNAL: NEGATIVE

## 2021-02-22 LAB
ANTIBODY SCREEN, EXTERNAL: NEGATIVE
HBSAG, EXTERNAL: NEGATIVE
HCT, EXTERNAL: 39.2
HEPATITIS C AB,   EXT: NEGATIVE
HGB, EXTERNAL: 13.9
HIV, EXTERNAL: NON REACTIVE
RPR, EXTERNAL: NON REACTIVE
RUBELLA, EXTERNAL: NORMAL
TYPE, ABO & RH, EXTERNAL: NORMAL

## 2021-09-08 LAB — GRBS, EXTERNAL: NEGATIVE

## 2021-09-20 ENCOUNTER — TRANSCRIBE ORDER (OUTPATIENT)
Dept: REGISTRATION | Age: 36
End: 2021-09-20

## 2021-09-20 ENCOUNTER — HOSPITAL ENCOUNTER (OUTPATIENT)
Dept: PREADMISSION TESTING | Age: 36
Discharge: HOME | End: 2021-09-20
Payer: COMMERCIAL

## 2021-09-20 DIAGNOSIS — Z01.812 PRE-PROCEDURE LAB EXAM: Primary | ICD-10-CM

## 2021-09-20 DIAGNOSIS — Z01.812 PRE-PROCEDURE LAB EXAM: ICD-10-CM

## 2021-09-20 PROCEDURE — U0005 INFEC AGEN DETEC AMPLI PROBE: HCPCS

## 2021-09-21 LAB
SARS-COV-2, XPLCVT: NOT DETECTED
SOURCE, COVRS: NORMAL

## 2021-09-27 NOTE — H&P
[facility]  [address]  [place], [address]  Phone: [phone], Fax: [phone]  Date: 2021    Pregnancy Problems:   Abnormal glucose tolerance test   Gestational diabetes mellitus - diet controlled   Rubella non-immune   History of gestational hypertension - ASA 12w   Hypertensive disorder - no meds    IOL   History of Present Illness: The patient presents today for a visit related to her prenatal care. Concerns: gestational diabetes and CHTN. BS fastings all good but sometimes random elevations of PP BS. Ate banana and it was 200 -- but overall everything has been ok. No HA, RUQ pain or visual changes. Wants breast pump Rx. She denies vaginal bleeding, contractions, discharge, leaking, and decreased fetal movement. Assessment/Plan  1. Routine  care  Z34.83: Encounter for supervision of other normal pregnancy, third trimester   ELECTRONIC BREAST PUMP -      Use As directed     Qty: 1 Unit     Refills: 0     Supplier: [facility] #[id number]   2. Gestation period, 38 weeks  Z3A.38: 38 weeks gestation of pregnancy    3. Active or passive immunization  Z23: Encounter for immunization   AFLURIA QUAD 2236-3601 60 MCG (15 MCG X 4)/0.5 ML INTRAMUSCULAR SUSP. -       influenza, injectable, quadrivalent     Site: Deltoid, Right     Administered 2021     Perform Date: 2021    4. Hypertensive disorder -  BPs good. No s/s of pre eclampsia. Continue weekly surveillance. IOL scheduled at 39w.  O10.013: Pre-existing essential hypertension complicating pregnancy, third trimester    5. Gestational diabetes mellitus -  Overall good control. Reviewed few outliers. No changes to regimen at this time. IOL scheduled for 1 week.   O24.410: Gestational diabetes mellitus in pregnancy, diet controlled      Return to Tyesha Adamson MD for Return OB at [facility] ([facility]) on 2021 at 04:00 PM  Xavier Snow MD for Return OB at [facility] () on 09/28/2021 at 06:00 AM   Radha Peña MD for Return OB at [facility] on 10/04/2021 at 07:45 AM  Prenatal Flowsheet:  Fundus Pres FHR FM PLS Cervix Exam BP Wt Edema Glucose Protein Leukocytes Nitrite Ketones Blood   02/22/2021   8 wks   [place]                         167    120/84 139 lbs  none neg       Comments: US prior. No HAs, is nauseous and vomiting every 3-4 days. No bleeding or spotting. [facility] delivery planned. No exposure to cat liter. urine cultured. Planning delivery at [facility]. Baseline CHTN labs today,. Plan ASA. Visit schedule/deck coverage reviewed. Planning MT21. RTC 4 weeks. 03/22/2021   12 wks   [place]                         161    120/84 141 lbs none         Comments: Still feeling terrible. Pt states she still has nausea and vomiting. No headaches. No vb. No swelling. Plan for [facility] next appt. AFP next appt. Not planning COVID vaccine during pregnancy. 04/19/2021   16 wks   [place]                         145    124/80 145 lbs none         Comments: Doing well! AFP today. 05/18/2021   20 wks 1 days                                          05/18/2021   20 wks 1 days   [place]                         143 Yes   140/88  138/82 151 lbs none none neg       Comments: EFW 378g/80%. Doing well. No HAs, VB or spotting. Reviewed pre-e precautions, periodic BP check recommended. Not on meds currently. Follow up 4 weeks. 05/20/2021   20 wks 3 days   av[place]                         157 Yes   132/90 149 lbs none         Comments: Doing well. patient presents today for follow up questions regarding request for more ultrasound photos. No HA's, nausea, vomiting, or VB. Reviewed findings. Questions answered. Follow up as scheduled. 06/16/2021   24 wks 2 days   [place]                       24 cm  138 Yes   118/80 159 lbs none         Comments: Patient doing well. No VB/spotting, no N/V, no HAs, active FM. She has noticed some swelling. She is noticing some discharge that is clear/white in color. 07/14/2021   28 wks 2 days   [place]                       28 cm Vertex 134    142/98  142/98 165 lbs none none neg       Comments: Patient states she had some bleeding Saturday doing some outdoor physical activity. (yard work) No HAs. No swelling. No nausea/ vomiting. Good FM. Elevated BP -- labs today. No meds at this time. Would like to plan delivery. 07/28/2021   30 wks 2 days   av[place]                         135 Yes   130/94 165 lbs none none neg       Comments: Failed 1 hr glucola (212). Has diabetic teaching tomorrow. C/o dizziness and nausea since she changed to a low carb diet/ low sugar diet. No other complaints. Denies swelling, VB, and d/c. Active FM.   07/29/2021   30 wks 3 days   [email or web address]                         151    128/80 166 lbs none         Comments: GDM teaching today. 08/04/2021   31 wks 2 days   avaclavik                         140 Yes   132/88 167 lbs none         Comments: OB Problem -- states she leaked some clear, odorless fluid out of her vagina this morning and has noticed a little in her underwear since then. Pt feels good Fm. SSE neg x3. Reassured no PPROM. 08/05/2021   31 wks 3 days   [email or web address]                                        Comments: VV - GDM follow up today. Doing well. Feeling much better after making her goal to have PP above 100. Fasting 65-73. One outlier at 141 after having a bagel w/ cream cheese. Reassured her this is a non-issue. Continue testing 4x/daily for the next week and bring log to [facility]. Dr. Korey Falk may take her down to twice daily testing if numbers remain excellent. 08/12/2021   32 wks 3 days   btozer                       32 cm Vertex 136 Yes   122/84 168 lbs none none neg       Comments: US prior -- BPP 8/8, EFW 72%. Doing well. Active FM. GD questions. Denies vaginal bleeding, abnormal discharge, LOF, contractions, headaches.  FBG 67-95, outliers 101 and 105 which seem to be diet related postprandials are all within range. Recommend she work on dietary changes to get her FBG <90 - send BG through portal in a week. If not <90, would start metformin at night. Freestyle Lite Glucometer - test strips and lancets Rx Counseled about the COVID vaccine, recommend the vaccine during pregnancy. She'll consider. 08/25/2021   34 wks 2 days   av[place]                       34 cm Vertex 131    130/90 170 lbs  none neg       Comments: Pt is doing well. She denies any headaches, n/v, bleeding, or spotting. Good fm. COVID vaccine discussed. BS good. No s/s of pre-e. RTC 2 weeks. Growth/GBS next appt. 09/08/2021   36 wks 2 days   [place]                        Vertex 137 Yes   128/88 174 lbs  none neg       Comments: EFW 3157g/77%. BPP 8/8. GBS today, NKDA. Doing well! BS in range. No s/s of pre-e. Reviewed plan for 39w IOL. WIll schedule ripening on 9/27 and IOL 9/28. GBS today. COVID testing reviewed. RTC 1 week. 09/08/2021   36 wks 2 days                           137       none neg       Comments: BPP 8/8   09/13/2021   37 wks   av[place]                       37 cm Vertex 156 Yes   122/86 175 lbs none         Comments: Doing well, no complaints. Declines cervical check. Plan cervical check next week. BS good. No s/s of pre-eclampsia.   09/20/2021   38 wks   av[place]                         130 Yes  3cm / 40% / -3 132/84 176 lbs none         Comments: rm 33 - doing well! No concerns. Some slight fluctuations postprandial. Tight 3. No need for ripening. Allergies:   Allergies not reviewed (last reviewed 09/08/2021)     NKDA   Medications:  Reviewed Medications     aspirin 81 mg tablet,delayed release  Take 1 tablet(s) every day by oral route. 07/28/21   entered    FreeStyle Lancets 28 gauge  TEST 4 TIMES PER DAY 08/12/21   filled    FreeStyle Lite Meter kit  USE TO TEST 4 TIMES PER DAY 07/23/21   filled    FreeStyle Lite Strips  USE TO TEST 4 TIMES A DAY 09/03/21   filled    mupirocin 2 % topical ointment  APPLY TWICE DAILY TO AFFECTED AREA ON FOOT WITH BAND AID ON TOP 04/06/21   filled    Prenatal 05/17/19   entered    Vital Signs:  09/20/2021 07:51 am  Wt: 176 lbs (79.83 kg) BP: 132/84     Problems:  Reviewed Problems     Essential hypertension - Onset: 07/15/2019   Family history of Ovarian carcinoma - Onset: 06/08/2016  Past Medical History  Discussed Past Medical History  Hypertension (high blood pressure): Y    Family History:  Discussed Family History    Father - Hypertensive disorder   Mother - Hypertensive disorder   Paternal Grandmother - Malignant tumor of ovary (onset age: [de-identified])   Social History:  Discussed Social History    Substance Use  Do you or have you ever smoked tobacco?: Never smoker  How many years have you smoked tobacco?: 0  How much tobacco do you smoke?: None  What was the date of your most recent tobacco screening?: 42/67/5593  Which illicit or recreational drugs have you used?: no  Education and Occupation  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  Is blood transfusion acceptable in an emergency?: Yes  OB/GYN Social History  Are you working: Yes  On average, how many days per week do you engage in moderate to strenuous EXERCISE (like walking fast, running, jogging, dancing, swimming, biking, or other activities that cause a light or heavy sweat)?: 0  How often do you have a DRINK containing ALCOHOL?: 2-4 times a month (Notes: not since +UPT)  Other  Marital status:   Have you recently (within the last 12 weeks, or during a current pregnancy) traveled to or lived in a Zika-affected area?: Yes (Notes: [facility] in March 2019)  Gender Identity and LGBTQ Identity  Physical Exam  Patient is a 30-year-old female. Constitutional:General Appearance: no acute distress. Mental Status Findings oriented to time, place, and person and appropriate mood. Head:Head: normocephalic. Respiratory:Lungs unlabored respiratory effort.     Abdomen:Inspection and Palpation: gravid abdomen. Female :Cervix: as documented in prenatal flowsheet. Extremities:Extremities: no edema. Skin:General Skin no rash or suspicious lesions and normal general appearance. OB Labs    Result Value Ref.  Range Date Collected Date Reviewed Entered By Note   Initial Labs             Blood Type A  02/22/2021 02/24/2021 [place]        D (Rh) Type Positive  02/22/2021 02/24/2021 [place]        Antibody Screen Negative NEGATIVE 02/22/2021 02/24/2021 av[place]        Antibody Screen Negative Negative 07/14/2021 07/17/2021 [place]        HCT - Initial 39.2 % 34.0-46.6 02/22/2021 02/24/2021 av[place]        HGB - Initial 13.9 g/dL 11.1-15.9 02/22/2021 02/24/2021 av[place]        MCV - Initial 92 fL 79-97 02/22/2021 02/24/2021 av[place]        PLT - Initial 192 x10E3/uL 150-450 02/22/2021 02/24/2021 [place]        VDRL - Initial Non Reactive NON REACTIVE 02/22/2021 02/24/2021 av[place]        Urine Culture/Screen No growth  02/22/2021 02/24/2021 [place]        HBsAg Negative NEGATIVE 02/22/2021 02/24/2021 [place]        HIV Counseling/Testing Non Reactive NON REACTIVE 02/22/2021 02/24/2021 av[place]        Chlamydia - Initial             Gonorrhea - Initial             Varicella             Rubella Not Immune index IMMUNE >0.99 02/22/2021 02/24/2021 [place]    Optional Labs             HGB Electrophoresis AA  02/22/2021 02/24/2021 [place]        PPD/Quanta             Pap Test             Cystic Fibrosis             HPV             Danny-Sachs             Familial Dysautonomia             Genetic Screening Tests             NST             TSH             Drug screen             HCV Ab Negative  02/22/2021 02/24/2021 [place]        HCV RNA             Urinalysis             Rhogam Injection         8-20 Week Labs             Ultrasound - Initial             1st Trimester Aneuploidy Risk Assessment             MSAFP/Multiple Markers   04/19/2021 04/21/2021 av[place]        2nd Trimester Serum Screening             Amnio/CVS             Karyotype             Amniotic Fluid (AFP)         24-28 Week Labs             HCT - 24-28 Weeks             HGB - 24-28 Weeks             MCV - 24-28 Weeks             PLT - 24-28 Weeks             Diabetes Screen 212  07/14/2021 07/23/2021 [email or web address]        GTT (If Screen Abnormal)             D (Rh) Antibody Screen         32-36 Week Labs             HCT - 32-36 Weeks             HGB - 32-36 Weeks             MCV - 32-36 Weeks             PLT - 32-36 Weeks             Ultrasound - 32-36 Weeks             HIV (When Indicated)             VDRL - 32-36 Weeks Negative Negative 07/14/2021 07/17/2021 av[place]        Gonorrhea - 32-36 Weeks             Chlamydia - 32-36 Weeks             Depression screening (when indicated)         35-37 Week Labs             Group B Strep Negative NEGATIVE 09/08/2021 09/10/2021 [place]        Resistance testing if penicillin allergic         Other Labs             Other - URIC ACID, SERUM OR PLASMA   02/22/2021 02/24/2021 [place]    Vaccines  Vaccines not reviewed (last reviewed 08/04/2021)    Vaccine Type Date Amt. Route Site Natalie Perea 47 Lot # Mfr. Exp.   Date VIS VIS  Given Vaccinator   Diphtheria, Tetanus, Pertussis   Tdap 07/14/21 0.5 mL Intramuscular Deltoid, Left  [id number] [employer]oSmith[employer] 05/07/23 Tdap 4/1/2020 07/14/21 Renuka Covert                                                     Tdap 12/23/19 0.5 mL Intramuscular Deltoid, Right  [id number] [employer] 01/11/22 Tdap 02/24/2015 12/23/19 Ramerjaye Lam                                                     Influenza   influenza, injectable, quadrivalent 09/20/21  Intramuscular Deltoid, Right [id number] Seq[employer] 06/30/22 Influenza (inactivated or recombinant) 8/6/2021 09/20/21 Sloane Glass                                                     influenza, injectable, quadrivalent, preservative free 09/23/19 0.5 mL Intramuscular Deltoid, Left  [id number] Seqirus 06/30/20 Inactivated Influenza 08/15/2019 09/23/19 Renuka Covert

## 2021-09-28 ENCOUNTER — ANESTHESIA (OUTPATIENT)
Dept: LABOR AND DELIVERY | Age: 36
End: 2021-09-28
Payer: COMMERCIAL

## 2021-09-28 ENCOUNTER — ANESTHESIA EVENT (OUTPATIENT)
Dept: LABOR AND DELIVERY | Age: 36
End: 2021-09-28
Payer: COMMERCIAL

## 2021-09-28 ENCOUNTER — HOSPITAL ENCOUNTER (INPATIENT)
Age: 36
LOS: 2 days | Discharge: HOME | End: 2021-09-30
Attending: OBSTETRICS & GYNECOLOGY | Admitting: OBSTETRICS & GYNECOLOGY
Payer: COMMERCIAL

## 2021-09-28 PROBLEM — O10.919 CHRONIC HYPERTENSION AFFECTING PREGNANCY: Status: ACTIVE | Noted: 2021-09-28

## 2021-09-28 LAB
ALBUMIN SERPL-MCNC: 2.6 G/DL (ref 3.5–5)
ALBUMIN/GLOB SERPL: 0.9 {RATIO} (ref 1.1–2.2)
ALP SERPL-CCNC: 92 U/L (ref 45–117)
ALT SERPL-CCNC: 15 U/L (ref 12–78)
ANION GAP SERPL CALC-SCNC: 9 MMOL/L (ref 5–15)
AST SERPL-CCNC: 16 U/L (ref 15–37)
BILIRUB SERPL-MCNC: 0.4 MG/DL (ref 0.2–1)
BUN SERPL-MCNC: 12 MG/DL (ref 6–20)
BUN/CREAT SERPL: 21 (ref 12–20)
CALCIUM SERPL-MCNC: 8.3 MG/DL (ref 8.5–10.1)
CHLORIDE SERPL-SCNC: 109 MMOL/L (ref 97–108)
CO2 SERPL-SCNC: 20 MMOL/L (ref 21–32)
COVID-19 RAPID TEST, COVR: NOT DETECTED
CREAT SERPL-MCNC: 0.56 MG/DL (ref 0.55–1.02)
CREAT UR-MCNC: 90.4 MG/DL
ERYTHROCYTE [DISTWIDTH] IN BLOOD BY AUTOMATED COUNT: 12.7 % (ref 11.5–14.5)
GLOBULIN SER CALC-MCNC: 3 G/DL (ref 2–4)
GLUCOSE BLD STRIP.AUTO-MCNC: 78 MG/DL (ref 65–117)
GLUCOSE SERPL-MCNC: 84 MG/DL (ref 65–100)
HCT VFR BLD AUTO: 35.7 % (ref 35–47)
HGB BLD-MCNC: 12.5 G/DL (ref 11.5–16)
MCH RBC QN AUTO: 33.2 PG (ref 26–34)
MCHC RBC AUTO-ENTMCNC: 35 G/DL (ref 30–36.5)
MCV RBC AUTO: 94.7 FL (ref 80–99)
NRBC # BLD: 0 K/UL (ref 0–0.01)
NRBC BLD-RTO: 0 PER 100 WBC
PLATELET # BLD AUTO: 150 K/UL (ref 150–400)
PMV BLD AUTO: 11.4 FL (ref 8.9–12.9)
POTASSIUM SERPL-SCNC: 3.7 MMOL/L (ref 3.5–5.1)
PROT SERPL-MCNC: 5.6 G/DL (ref 6.4–8.2)
PROT UR-MCNC: 15 MG/DL (ref 0–11.9)
PROT/CREAT UR-RTO: 0.2
RBC # BLD AUTO: 3.77 M/UL (ref 3.8–5.2)
SARS-COV-2, COV2: NORMAL
SERVICE CMNT-IMP: NORMAL
SODIUM SERPL-SCNC: 138 MMOL/L (ref 136–145)
SOURCE, COVRS: NORMAL
WBC # BLD AUTO: 5.5 K/UL (ref 3.6–11)

## 2021-09-28 PROCEDURE — 75410000003 HC RECOV DEL/VAG/CSECN EA 0.5 HR

## 2021-09-28 PROCEDURE — 74011250636 HC RX REV CODE- 250/636: Performed by: ANESTHESIOLOGY

## 2021-09-28 PROCEDURE — 84156 ASSAY OF PROTEIN URINE: CPT

## 2021-09-28 PROCEDURE — 74011000250 HC RX REV CODE- 250: Performed by: ANESTHESIOLOGY

## 2021-09-28 PROCEDURE — 82962 GLUCOSE BLOOD TEST: CPT

## 2021-09-28 PROCEDURE — 77030019905 HC CATH URETH INTMIT MDII -A

## 2021-09-28 PROCEDURE — 75410000000 HC DELIVERY VAGINAL/SINGLE

## 2021-09-28 PROCEDURE — 74011250636 HC RX REV CODE- 250/636: Performed by: OBSTETRICS & GYNECOLOGY

## 2021-09-28 PROCEDURE — 74011250637 HC RX REV CODE- 250/637: Performed by: OBSTETRICS & GYNECOLOGY

## 2021-09-28 PROCEDURE — 0HQ9XZZ REPAIR PERINEUM SKIN, EXTERNAL APPROACH: ICD-10-PCS | Performed by: OBSTETRICS & GYNECOLOGY

## 2021-09-28 PROCEDURE — 87635 SARS-COV-2 COVID-19 AMP PRB: CPT

## 2021-09-28 PROCEDURE — 76060000078 HC EPIDURAL ANESTHESIA

## 2021-09-28 PROCEDURE — 65410000002 HC RM PRIVATE OB

## 2021-09-28 PROCEDURE — 36415 COLL VENOUS BLD VENIPUNCTURE: CPT

## 2021-09-28 PROCEDURE — 85027 COMPLETE CBC AUTOMATED: CPT

## 2021-09-28 PROCEDURE — 80053 COMPREHEN METABOLIC PANEL: CPT

## 2021-09-28 PROCEDURE — 75410000002 HC LABOR FEE PER 1 HR

## 2021-09-28 RX ORDER — SODIUM CHLORIDE, SODIUM LACTATE, POTASSIUM CHLORIDE, CALCIUM CHLORIDE 600; 310; 30; 20 MG/100ML; MG/100ML; MG/100ML; MG/100ML
125 INJECTION, SOLUTION INTRAVENOUS CONTINUOUS
Status: DISCONTINUED | OUTPATIENT
Start: 2021-09-28 | End: 2021-09-28

## 2021-09-28 RX ORDER — DIPHENHYDRAMINE HCL 25 MG
25 CAPSULE ORAL
Status: DISCONTINUED | OUTPATIENT
Start: 2021-09-28 | End: 2021-09-30 | Stop reason: HOSPADM

## 2021-09-28 RX ORDER — IBUPROFEN 400 MG/1
800 TABLET ORAL EVERY 8 HOURS
Status: DISCONTINUED | OUTPATIENT
Start: 2021-09-28 | End: 2021-09-30 | Stop reason: HOSPADM

## 2021-09-28 RX ORDER — ONDANSETRON 2 MG/ML
4 INJECTION INTRAMUSCULAR; INTRAVENOUS
Status: DISCONTINUED | OUTPATIENT
Start: 2021-09-28 | End: 2021-09-28

## 2021-09-28 RX ORDER — HYDROCORTISONE 1 %
CREAM (GRAM) TOPICAL AS NEEDED
Status: DISCONTINUED | OUTPATIENT
Start: 2021-09-28 | End: 2021-09-30 | Stop reason: HOSPADM

## 2021-09-28 RX ORDER — ACETAMINOPHEN 325 MG/1
650 TABLET ORAL
Status: DISCONTINUED | OUTPATIENT
Start: 2021-09-28 | End: 2021-09-30 | Stop reason: HOSPADM

## 2021-09-28 RX ORDER — SODIUM CHLORIDE 0.9 % (FLUSH) 0.9 %
5-40 SYRINGE (ML) INJECTION EVERY 8 HOURS
Status: DISCONTINUED | OUTPATIENT
Start: 2021-09-28 | End: 2021-09-28

## 2021-09-28 RX ORDER — BUPIVACAINE HYDROCHLORIDE 5 MG/ML
INJECTION, SOLUTION EPIDURAL; INTRACAUDAL AS NEEDED
Status: DISCONTINUED | OUTPATIENT
Start: 2021-09-28 | End: 2021-09-28 | Stop reason: HOSPADM

## 2021-09-28 RX ORDER — SODIUM CHLORIDE 0.9 % (FLUSH) 0.9 %
5-40 SYRINGE (ML) INJECTION AS NEEDED
Status: DISCONTINUED | OUTPATIENT
Start: 2021-09-28 | End: 2021-09-28

## 2021-09-28 RX ORDER — FENTANYL CITRATE 50 UG/ML
100 INJECTION, SOLUTION INTRAMUSCULAR; INTRAVENOUS ONCE
Status: DISCONTINUED | OUTPATIENT
Start: 2021-09-28 | End: 2021-09-28

## 2021-09-28 RX ORDER — ONDANSETRON 4 MG/1
4 TABLET, ORALLY DISINTEGRATING ORAL
Status: DISCONTINUED | OUTPATIENT
Start: 2021-09-28 | End: 2021-09-30 | Stop reason: HOSPADM

## 2021-09-28 RX ORDER — DIPHENHYDRAMINE HYDROCHLORIDE 50 MG/ML
12.5 INJECTION, SOLUTION INTRAMUSCULAR; INTRAVENOUS
Status: DISCONTINUED | OUTPATIENT
Start: 2021-09-28 | End: 2021-09-28

## 2021-09-28 RX ORDER — BUPIVACAINE HYDROCHLORIDE 5 MG/ML
30 INJECTION, SOLUTION EPIDURAL; INTRACAUDAL AS NEEDED
Status: DISCONTINUED | OUTPATIENT
Start: 2021-09-28 | End: 2021-09-28

## 2021-09-28 RX ORDER — OXYTOCIN/RINGER'S LACTATE 30/500 ML
10 PLASTIC BAG, INJECTION (ML) INTRAVENOUS AS NEEDED
Status: DISCONTINUED | OUTPATIENT
Start: 2021-09-28 | End: 2021-09-30 | Stop reason: HOSPADM

## 2021-09-28 RX ORDER — AMMONIA 15 % (W/V)
1 AMPUL (EA) INHALATION AS NEEDED
Status: DISCONTINUED | OUTPATIENT
Start: 2021-09-28 | End: 2021-09-30 | Stop reason: HOSPADM

## 2021-09-28 RX ORDER — NALOXONE HYDROCHLORIDE 0.4 MG/ML
0.4 INJECTION, SOLUTION INTRAMUSCULAR; INTRAVENOUS; SUBCUTANEOUS AS NEEDED
Status: DISCONTINUED | OUTPATIENT
Start: 2021-09-28 | End: 2021-09-28

## 2021-09-28 RX ORDER — OXYTOCIN/RINGER'S LACTATE 30/500 ML
0-20 PLASTIC BAG, INJECTION (ML) INTRAVENOUS
Status: DISCONTINUED | OUTPATIENT
Start: 2021-09-28 | End: 2021-09-28

## 2021-09-28 RX ORDER — EPHEDRINE SULFATE/0.9% NACL/PF 50 MG/5 ML
12.5 SYRINGE (ML) INTRAVENOUS
Status: DISCONTINUED | OUTPATIENT
Start: 2021-09-28 | End: 2021-09-28

## 2021-09-28 RX ORDER — OXYTOCIN/RINGER'S LACTATE 30/500 ML
87.3 PLASTIC BAG, INJECTION (ML) INTRAVENOUS AS NEEDED
Status: DISCONTINUED | OUTPATIENT
Start: 2021-09-28 | End: 2021-09-30 | Stop reason: HOSPADM

## 2021-09-28 RX ORDER — SIMETHICONE 80 MG
80 TABLET,CHEWABLE ORAL
Status: DISCONTINUED | OUTPATIENT
Start: 2021-09-28 | End: 2021-09-30 | Stop reason: HOSPADM

## 2021-09-28 RX ORDER — BUTORPHANOL TARTRATE 1 MG/ML
2 INJECTION INTRAMUSCULAR; INTRAVENOUS
Status: DISCONTINUED | OUTPATIENT
Start: 2021-09-28 | End: 2021-09-28

## 2021-09-28 RX ORDER — TERBUTALINE SULFATE 1 MG/ML
0.25 INJECTION SUBCUTANEOUS AS NEEDED
Status: DISCONTINUED | OUTPATIENT
Start: 2021-09-28 | End: 2021-09-28

## 2021-09-28 RX ORDER — SODIUM CHLORIDE 0.9 % (FLUSH) 0.9 %
5-40 SYRINGE (ML) INJECTION EVERY 8 HOURS
Status: DISCONTINUED | OUTPATIENT
Start: 2021-09-28 | End: 2021-09-30 | Stop reason: HOSPADM

## 2021-09-28 RX ORDER — FENTANYL/BUPIVACAINE/NS/PF 2-1250MCG
1-16 PREFILLED PUMP RESERVOIR EPIDURAL CONTINUOUS
Status: DISCONTINUED | OUTPATIENT
Start: 2021-09-28 | End: 2021-09-28

## 2021-09-28 RX ORDER — DOCUSATE SODIUM 100 MG/1
100 CAPSULE, LIQUID FILLED ORAL
Status: DISCONTINUED | OUTPATIENT
Start: 2021-09-28 | End: 2021-09-30 | Stop reason: HOSPADM

## 2021-09-28 RX ORDER — SODIUM CHLORIDE 0.9 % (FLUSH) 0.9 %
5-40 SYRINGE (ML) INJECTION AS NEEDED
Status: DISCONTINUED | OUTPATIENT
Start: 2021-09-28 | End: 2021-09-30 | Stop reason: HOSPADM

## 2021-09-28 RX ORDER — OXYTOCIN/RINGER'S LACTATE 30/500 ML
10 PLASTIC BAG, INJECTION (ML) INTRAVENOUS AS NEEDED
Status: COMPLETED | OUTPATIENT
Start: 2021-09-28 | End: 2021-09-28

## 2021-09-28 RX ORDER — FENTANYL CITRATE 50 UG/ML
INJECTION, SOLUTION INTRAMUSCULAR; INTRAVENOUS AS NEEDED
Status: DISCONTINUED | OUTPATIENT
Start: 2021-09-28 | End: 2021-09-28 | Stop reason: HOSPADM

## 2021-09-28 RX ADMIN — Medication 10 ML/HR: at 12:01

## 2021-09-28 RX ADMIN — FENTANYL CITRATE 100 MCG: 50 INJECTION, SOLUTION INTRAMUSCULAR; INTRAVENOUS at 11:58

## 2021-09-28 RX ADMIN — SODIUM CHLORIDE, POTASSIUM CHLORIDE, SODIUM LACTATE AND CALCIUM CHLORIDE 125 ML/HR: 600; 310; 30; 20 INJECTION, SOLUTION INTRAVENOUS at 12:01

## 2021-09-28 RX ADMIN — LIDOCAINE HYDROCHLORIDE 3 MG: 10; .005 INJECTION, SOLUTION EPIDURAL; INFILTRATION; INTRACAUDAL; PERINEURAL at 11:59

## 2021-09-28 RX ADMIN — LIDOCAINE HYDROCHLORIDE 2 MG: 10; .005 INJECTION, SOLUTION EPIDURAL; INFILTRATION; INTRACAUDAL; PERINEURAL at 11:57

## 2021-09-28 RX ADMIN — SODIUM CHLORIDE, POTASSIUM CHLORIDE, SODIUM LACTATE AND CALCIUM CHLORIDE 125 ML/HR: 600; 310; 30; 20 INJECTION, SOLUTION INTRAVENOUS at 06:43

## 2021-09-28 RX ADMIN — BUPIVACAINE HYDROCHLORIDE 2 ML: 5 INJECTION, SOLUTION EPIDURAL; INTRACAUDAL; PERINEURAL at 12:01

## 2021-09-28 RX ADMIN — IBUPROFEN 800 MG: 400 TABLET ORAL at 19:00

## 2021-09-28 RX ADMIN — BUPIVACAINE HYDROCHLORIDE 2 ML: 5 INJECTION, SOLUTION EPIDURAL; INTRACAUDAL; PERINEURAL at 12:00

## 2021-09-28 RX ADMIN — OXYTOCIN 10000 MILLI-UNITS: 10 INJECTION INTRAVENOUS at 16:00

## 2021-09-28 RX ADMIN — OXYTOCIN 1 MILLI-UNITS/MIN: 10 INJECTION INTRAVENOUS at 06:48

## 2021-09-28 NOTE — PROGRESS NOTES
In room to see patient. Comfortable with epidural.    Visit Vitals  /64   Pulse (!) 59   Temp 98.2 °F (36.8 °C)   Resp 16   Ht 5' 4\" (1.626 m)   Wt 75 kg (165 lb 5.5 oz)   SpO2 96%   BMI 28.38 kg/m²     SVE: 4-5/C/-2  EFM: Cat 1  Eagar: CTX q2-3     A/P: Pt is a 32 yo  at 39+1 for IOL for CHTN and GDMA1  - Comfortable with epidural, has made cervical change. - Continue FS per protocol  - BPs stable, no s/s of pre-e.  - Rubella NI --> needs MMR PP.  - Will increase max pit to 30 if needed. - Continue to anticipate .

## 2021-09-28 NOTE — ANESTHESIA PROCEDURE NOTES
Epidural Block    Patient location during procedure: OB  Start time: 9/28/2021 11:47 AM  End time: 9/28/2021 12:01 PM  Reason for block: labor epidural  Staffing  Performed: attending   Preanesthetic Checklist  Completed: patient identified, IV checked, site marked, risks and benefits discussed, surgical consent, monitors and equipment checked, pre-op evaluation and timeout performed  Block Placement  Patient position: left lateral decubitus  Prep: DuraPrep  Sterility prep: cap, drape, gloves and mask  Sedation level: no sedation  Patient monitoring: continuous pulse oximetry and heart rate  Approach: midline  Location: lumbar  Lumbar location: L3-L4  Epidural  Loss of resistance technique: air  Guidance: landmark technique  Needle  Needle type: Tuohy   Needle gauge: 17 G  Needle length: 9 cm  Needle insertion depth: 7 cm  Catheter type: end hole  Catheter size: 19 G  Catheter at skin depth: 12 cm  Catheter securement method: clear occlusive dressing and surgical tape  Test dose: negative  Assessment  Sensory level: T10  Block outcome: pain improved  Number of attempts: 1  Procedure assessment: patient tolerated procedure well with no immediate complications

## 2021-09-28 NOTE — PROGRESS NOTES
1538 Bedside report received from [address] [place] [address]:    Verbal report given to [employer] (name) on Shakila Guzman  being transferred to MIU (unit) for routine progression of care       Report consisted of patients Situation, Background, Assessment and   Recommendations(SBAR). Information from the following report(s) SBAR, Kardex, Intake/Output, MAR and Recent Results was reviewed with the receiving nurse. Lines:   Saline Lock 09/28/21 Left;Posterior Hand (Active)   Site Assessment Clean, dry, & intact 09/28/21 0755   Phlebitis Assessment 0 09/28/21 0755   Infiltration Assessment 0 09/28/21 0755   Dressing Status Clean, dry, & intact 09/28/21 0755   Dressing Type Transparent;Tape 09/28/21 0755   Hub Color/Line Status Pink 09/28/21 0755        Opportunity for questions and clarification was provided.       Patient transported with:   Registered Nurse

## 2021-09-28 NOTE — L&D DELIVERY NOTE
Delivery Summary  Patient: Jessi Chacko             Circumcision:   desires  Additional Delivery Comments - Uncomplicated  after IOL for CHTN/GDMA1. Tight nuchal, clamped/cut at perineum. Small 1st degree laceration repaired without difficulty. Information for the patient's :  Nimojean Otis [[id number]]     Delivery Type: Vaginal, Spontaneous   Delivery Date: 2021   Delivery Time: 3:57 PM     Birth Weight:       Sex:  male  Delivery Clinician:  Kenan Santoro   Gestational Age: 36w3d    Presentation: Vertex   Position:             Apgars were 5  and 9      Resuscitation Method: Suctioning-bulb; Tactile Stimulation;Suctioning-deep     Meconium Stained: Terminal    Living Status: Living       Placenta Date/Time: 2021  4:00 PM   Placenta Removal: Spontaneous   Placenta Appearance: Normal    Cord Information: 3 Vessels    Cord Events: Nuchal Cord With Compressions       Disposition of Cord Blood: Discard    Blood Gases Sent?:  No       Cord pH:  none    Episiotomy: None   Laceration(s): 1st     Estimated Blood Loss (ml): No data found 300mL    Labor Events  Method: Oxytocin      Augmentation:    Cervical Ripening:     None        Operative Vaginal Delivery - none

## 2021-09-28 NOTE — ANESTHESIA PREPROCEDURE EVALUATION
Relevant Problems   No relevant active problems       Anesthetic History   No history of anesthetic complications            Review of Systems / Medical History  Patient summary reviewed, nursing notes reviewed and pertinent labs reviewed    Pulmonary  Within defined limits                 Neuro/Psych   Within defined limits           Cardiovascular    Hypertension: well controlled                   GI/Hepatic/Renal  Within defined limits              Endo/Other  Within defined limits           Other Findings                   Anesthetic Plan    ASA: 2  Anesthesia type: epidural          Induction: Intravenous  Anesthetic plan and risks discussed with: Patient

## 2021-09-28 NOTE — PROGRESS NOTES
6231 Bedside and Verbal shift change report given to HEIDY Rodriguez RN (oncoming nurse) by HEIDY Bolivar RN (offgoing nurse). Report included the following information SBAR, Intake/Output, MAR and Recent Results. 1019 Dr Winifred Hale at bedside. SVE 4    1147 Dr Gera Wood at bedside for epidural    1538 Bedside and Verbal shift change report given to ISSAC Doan RN (oncoming nurse) by Aroldo Dukes RN (offgoing nurse). Report included the following information SBAR, Intake/Output, MAR and Recent Results.

## 2021-09-29 PROCEDURE — 2709999900 HC NON-CHARGEABLE SUPPLY

## 2021-09-29 PROCEDURE — 74011250637 HC RX REV CODE- 250/637: Performed by: OBSTETRICS & GYNECOLOGY

## 2021-09-29 PROCEDURE — 65410000002 HC RM PRIVATE OB

## 2021-09-29 RX ORDER — IBUPROFEN 600 MG/1
600 TABLET ORAL
Qty: 40 TABLET | Refills: 0 | Status: SHIPPED | OUTPATIENT
Start: 2021-09-29

## 2021-09-29 RX ADMIN — DOCUSATE SODIUM 100 MG: 100 CAPSULE, LIQUID FILLED ORAL at 10:59

## 2021-09-29 RX ADMIN — IBUPROFEN 800 MG: 400 TABLET ORAL at 12:05

## 2021-09-29 RX ADMIN — MUPIROCIN: 20 OINTMENT TOPICAL at 10:59

## 2021-09-29 RX ADMIN — IBUPROFEN 800 MG: 400 TABLET ORAL at 19:53

## 2021-09-29 RX ADMIN — IBUPROFEN 800 MG: 400 TABLET ORAL at 04:19

## 2021-09-29 NOTE — ROUTINE PROCESS
Bedside shift change report given to Emilie (oncoming nurse) by Mateo Faulkner RN (offgoing nurse). Report included the following information SBAR.

## 2021-09-29 NOTE — PROGRESS NOTES
Post-Partum Day Number 1 Progress Note    Wendiejoe Santillan     Assessment: Doing well, post partum day 1    Plan:  1. Continue routine postpartum and perineal care as well as maternal education. 2. The risks and benefits of the circumcision  procedure and anesthesia including: bleeding, infection, variability of cosmetic results were discussed at length with the mother. She is aware that future repeat procedures may be necessary. She gives informed consent to proceed as noted and her questions are answered. 3. CHTN: no meds, normotensive  4. Rubella nonimmune: plan MMR prior to discharge  5. GDMA1: plan 2 hr gtt with postpartum visit    Information for the patient's :  Taiwo Ramos [[id number]]   Vaginal, Spontaneous      Subjective:  Patient doing well without significant complaint. Voiding without difficulty, normal lochia. Vitals:  Visit Vitals  /89 (BP 1 Location: Right arm, BP Patient Position: At rest)   Pulse 71   Temp 98 °F (36.7 °C)   Resp 16   Ht 5' 4\" (1.626 m)   Wt 75 kg (165 lb 5.5 oz)   SpO2 100%   Breastfeeding Unknown   BMI 28.38 kg/m²     Temp (24hrs), Av.3 °F (36.8 °C), Min:98 °F (36.7 °C), Max:98.5 °F (36.9 °C)        Exam:   Patient without distress. Abdomen soft, fundus firm, nontender                Perineum with normal lochia noted. Lower extremities are negative for swelling, cords or tenderness.     Labs:     Lab Results   Component Value Date/Time    WBC 5.5 2021 06:28 AM    WBC 8.7 2020 04:43 PM    WBC 6.2 2014 11:15 AM    WBC 7.4 2011 06:50 PM    HGB 12.5 2021 06:28 AM    HGB 12.3 2020 04:43 PM    HGB 14.2 2014 11:15 AM    HGB 14.1 2011 06:50 PM    HCT 35.7 2021 06:28 AM    HCT 36.0 2020 04:43 PM    HCT 41.2 2014 11:15 AM    HCT 40.4 2011 06:50 PM    PLATELET 050  06:28 AM    PLATELET 597  04:43 PM    PLATELET 752  11:15 AM    PLATELET 638 34/87/1585 06:50 PM    Hgb, External 13.9 02/22/2021 12:00 AM    Hgb, External 13.8 08/12/2019 12:00 AM    Hct, External 39.2 02/22/2021 12:00 AM    Hct, External 40.1 08/12/2019 12:00 AM    Platelet cnt., External 211,000 08/12/2019 12:00 AM       Recent Results (from the past 24 hour(s))   GLUCOSE, POC    Collection Time: 09/28/21  2:50 PM   Result Value Ref Range    Glucose (POC) 78 65 - 117 mg/dL    Performed by Noé Reid

## 2021-09-29 NOTE — DISCHARGE SUMMARY
Obstetrical Discharge Summary     Name: Eduar Mott MRN: [id number]  SSN: [id number]    YOB: 1985  Age: 31 y.o. Sex: female      Admit Date: 2021    Discharge Date: 2021     Admitting Physician: Saige De Santiago MD     Attending Physician:  Merline Mosqueda MD     Admission Diagnoses: Chronic hypertension affecting pregnancy [O10.919]   GDMA1    Discharge Diagnoses:   Information for the patient's :  King Farias [[id number]]   Delivery of a 3.485 kg male infant via Vaginal, Spontaneous on 2021 at 3:57 PM  by Saige De Santiago. Apgars were 5  and 9 . CHTN-no meds  GDMA1    Additional Diagnoses:   Hospital Problems  Never Reviewed        Codes Class Noted POA    Chronic hypertension affecting pregnancy ICD-10-CM: O10.919  ICD-9-CM: 642.00  2021 Unknown             Lab Results   Component Value Date/Time    Rubella, External equivocal 2021 12:00 AM    GrBStrep, External negative 2021 12:00 AM       Hospital Course: Normal hospital course following the delivery. She was normotensive without medication. She was rubella nonimmune and received an MMR prior to discharge. Disposition at Discharge: Home or self care    Discharged Condition: Stable    Patient Instructions:   Current Discharge Medication List      CONTINUE these medications which have CHANGED    Details   ibuprofen (MOTRIN) 600 mg tablet Take 1 Tablet by mouth every six (6) hours as needed for Pain. Qty: 40 Tablet, Refills: 0             Reference my discharge instructions. Follow-up Appointments   Procedures    FOLLOW UP VISIT Appointment in: Ten Days BP check     BP check     Standing Status:   Standing     Number of Occurrences:   1     Order Specific Question:   Appointment in     Answer:   Ten Days        Signed By:  Vidya Sandoval MD     2021                    .

## 2021-09-29 NOTE — LACTATION NOTE
This note was copied from a baby's chart. Mother with inverted nipples was unsuccessful at achieving latch with last baby. She does not want to breastfeed this baby, but wants to exclusively pump. She was very successful with that having overproduction. Mother is already pumping transitional milk, up to 40 ml. Mother has been using flange that is too small 25, mother fitted for 27, much more comfortable. Mother's nipples are damaged with excoriation, bleeding, and redness, nurse is contacting OB about [facility] [employer].  Mother also given shells to wear to prevent cloth from rubbing sore nipples.

## 2021-09-29 NOTE — PROGRESS NOTES
Bedside and Verbal shift change report given to BARBARA Landry RN (oncoming nurse) by Edith Canchola (offgoing nurse). Report included the following information SBAR.

## 2021-09-30 VITALS
HEIGHT: 64 IN | RESPIRATION RATE: 13 BRPM | BODY MASS INDEX: 28.23 KG/M2 | OXYGEN SATURATION: 98 % | TEMPERATURE: 98.2 F | DIASTOLIC BLOOD PRESSURE: 85 MMHG | SYSTOLIC BLOOD PRESSURE: 124 MMHG | WEIGHT: 165.34 LBS | HEART RATE: 72 BPM

## 2021-09-30 PROCEDURE — 74011250637 HC RX REV CODE- 250/637: Performed by: OBSTETRICS & GYNECOLOGY

## 2021-09-30 RX ADMIN — MUPIROCIN: 20 OINTMENT TOPICAL at 12:08

## 2021-09-30 RX ADMIN — IBUPROFEN 800 MG: 400 TABLET ORAL at 04:04

## 2021-09-30 RX ADMIN — IBUPROFEN 800 MG: 400 TABLET ORAL at 12:06

## 2021-09-30 NOTE — ROUTINE PROCESS
Bedside shift change report given to HEIDY Nelson (oncoming nurse) by Brandon Sherman (offgoing nurse). Report included the following information SBAR.

## 2021-09-30 NOTE — PROGRESS NOTES
Post-Partum Day Number 2 Progress Note    Umair Santillan     Assessment: Doing well, post partum day 2  CHTN - normotensive, no meds, will monitor at home  GDMA1 - plan for 2hr GTT at 6wk PP  Rubella nonimmune - pt declines today as she desires to get COVID vaccination done first, then will get MMR after COVID vaccination complete    Plan:   1. Discharge home today  2. Follow up in office in 6 weeks with Wander Aguirre MD  3. Post partum activity advised, diet as tolerated  4. Discharge Medications: ibuprofen, and medications prior to admission    Information for the patient's :  Vishnu Betancourt [[id number]]   Vaginal, Spontaneous    Patient doing well without significant complaint. Voiding without difficulty, normal lochia. Vitals:  Visit Vitals  /85   Pulse 72   Temp 98.2 °F (36.8 °C)   Resp 13   Ht 5' 4\" (1.626 m)   Wt 75 kg (165 lb 5.5 oz)   SpO2 98%   Breastfeeding Unknown   BMI 28.38 kg/m²     Temp (24hrs), Av °F (36.7 °C), Min:97.7 °F (36.5 °C), Max:98.2 °F (36.8 °C)      Exam:         Patient without distress. Abdomen soft, fundus firm, nontender                 Lower extremities are negative for swelling, cords or tenderness.     Labs:     Lab Results   Component Value Date/Time    WBC 5.5 2021 06:28 AM    WBC 8.7 2020 04:43 PM    WBC 6.2 2014 11:15 AM    WBC 7.4 2011 06:50 PM    HGB 12.5 2021 06:28 AM    HGB 12.3 2020 04:43 PM    HGB 14.2 2014 11:15 AM    HGB 14.1 2011 06:50 PM    HCT 35.7 2021 06:28 AM    HCT 36.0 2020 04:43 PM    HCT 41.2 2014 11:15 AM    HCT 40.4 2011 06:50 PM    PLATELET 717  06:28 AM    PLATELET 591 10/88/2589 04:43 PM    PLATELET 677  11:15 AM    PLATELET 322  06:50 PM    Hgb, External 13.9 2021 12:00 AM    Hgb, External 13.8 2019 12:00 AM    Hct, External 39.2 2021 12:00 AM    Hct, External 40.1 2019 12:00 AM    Platelet cnt., External 211,000 08/12/2019 12:00 AM       No results found for this or any previous visit (from the past 24 hour(s)).

## 2021-09-30 NOTE — ROUTINE PROCESS
0800: Bedside shift change report given to BAKARI Mathew RN (oncoming nurse) by Karrie Whiteside RN (offgoing nurse). Report included the following information SBAR.     2053: I have reviewed discharge instructions with the patient. The patient verbalized understanding.

## 2021-09-30 NOTE — DISCHARGE INSTRUCTIONS
Patient Education        Vaginal Childbirth: Care Instructions  Overview     Vaginal birth means delivering a baby through the birth canal (vagina). During labor, the uterus tightens (contracts) regularly to thin and open the cervix and to push the baby out through the birth canal.  Your body will slowly heal in the next few weeks. It's easy to get too tired and overwhelmed during the first weeks after your baby is born. Changes in your hormones can shift your mood without warning. You may find it hard to meet the extra demands on your energy and time. Take it easy on yourself. Follow-up care is a key part of your treatment and safety. Be sure to make and go to all appointments, and call your doctor if you are having problems. It's also a good idea to know your test results and keep a list of the medicines you take. How can you care for yourself at home? Vaginal bleeding and cramps  · After delivery, you will have a bloody discharge from your vagina. This will turn pink within a week and then white or yellow after about 10 days. It may last for 2 to 4 weeks or longer, until the uterus has healed. Use sanitary pads until you stop bleeding. Using pads makes it easier to monitor your bleeding. · Don't worry if you pass some blood clots, as long as they are smaller than a golf ball. If you have a tear or stitches in your vaginal area, change the pad at least every 4 hours. This will help prevent soreness and infection. · You may have cramps for the first few days after childbirth. These are normal and occur as the uterus shrinks to normal size. Take an over-the-counter pain medicine, such as acetaminophen (Tylenol), ibuprofen (Advil, Motrin), or naproxen (Aleve), for cramps. Read and follow all instructions on the label. Do not take aspirin, because it can cause more bleeding. · Do not take two or more pain medicines at the same time unless the doctor told you to.  Many pain medicines have acetaminophen, which is Tylenol. Too much acetaminophen (Tylenol) can be harmful. Stitches  · If you have stitches, they will dissolve on their own and don't need to be removed. Follow your doctor's instructions for cleaning the stitched area. · Put ice or a cold pack on your painful area for 10 to 20 minutes at a time, several times a day, for the first few days. Put a thin cloth between the ice and your skin. · Sit in a few inches of warm water (sitz bath) 3 times a day and after bowel movements. The warm water helps with pain and itching. If you don't have a tub, a warm shower might help. Breast fullness  · Your breasts may overfill (engorge) in the first few days after delivery. To help milk flow and to relieve pain, warm your breasts in the shower or by using warm, moist towels before nursing. · If you aren't nursing, don't put warmth on your breasts or touch your breasts. Wear a bra that fits well and use ice until the fullness goes away. This usually takes 2 to 3 days. · Put ice or a cold pack on your breast after nursing to reduce swelling and pain. Put a thin cloth between the ice and your skin. Activity  · Eat a balanced diet. Don't try to lose weight by cutting calories. Keep taking your prenatal vitamins, or take a multivitamin. · Get as much rest as you can. Try to take naps when your baby sleeps during the day. · Get some exercise every day. But don't do any heavy exercise until your doctor says it is okay. · Wait until you are healed (about 4 to 6 weeks) before you have sexual intercourse. Your doctor will tell you when it is okay to have sex. · If you don't want to get pregnant, talk to your doctor about birth control. You can get pregnant even before your period returns. Also, you can get pregnant while you are breastfeeding. Mental health  · It's normal to have some sadness, anxiety, sleeplessness, and mood swings after you go home.  If you feel upset or hopeless for more than a few days or are having trouble doing the things you need to do, talk to your doctor. Constipation and hemorrhoids  · Drink plenty of fluids. If you have kidney, heart, or liver disease and have to limit fluids, talk with your doctor before you increase the amount of fluids you drink. · Eat plenty of fiber each day. Have a bran muffin or bran cereal for breakfast. Try eating a piece of fruit for a mid-afternoon snack. · For painful, itchy hemorrhoids, put ice or a cold pack on the area several times a day for 10 minutes at a time. Follow this by putting a warm compress on the area for another 10 to 20 minutes or by sitting in a shallow, warm bath. When should you call for help? Call 911  anytime you think you may need emergency care. For example, call if:    · You have thoughts of harming yourself, your baby, or another person.     · You passed out (lost consciousness).     · You have chest pain, are short of breath, or cough up blood.     · You have a seizure. Call your doctor now or seek immediate medical care if:    · You have severe vaginal bleeding.     · You are dizzy or lightheaded, or you feel like you may faint.     · You have a fever.     · You have new or more pain in your belly or pelvis.     · You have symptoms of a blood clot in your leg (called a deep vein thrombosis), such as:  ? Pain in the calf, back of the knee, thigh, or groin. ? Redness and swelling in your leg or groin.     · You have signs of preeclampsia, such as:  ? Sudden swelling of your face, hands, or feet. ? New vision problems (such as dimness, blurring, or seeing spots). ? A severe headache. Watch closely for changes in your health, and be sure to contact your doctor if:    · Your vaginal bleeding seems to be getting heavier.     · You have new or worse vaginal discharge.     · You feel sad, anxious, or hopeless for more than a few days.     · You do not get better as expected. Where can you learn more?   Go to http://www.gray.com/  Enter A281 in the search box to learn more about \"Vaginal Childbirth: Care Instructions. \"  Current as of: June 16, 2021               Content Version: 13.0  © 4940-5803 Healthwise, [facility]. Care instructions adapted under license by [employer] (which disclaims liability or warranty for this information). If you have questions about a medical condition or this instruction, always ask your healthcare professional. [address] any warranty or liability for your use of this information. Postpartum Support Groups (virtual)  We know that all of us are dealing with a tremendous amount of uncertainty, confusion and disruption to our daily lives, which may result in increased anxiety, depression and fear. If you are feeling unsettled or worse, please know that we are here to help. During this time of increased caution and care for one another, [facility] [place] ([address] [place] [address]) is offering virtual support groups to ALL MOTHERS in [place] regardless of the age of your child/children as a way to help weather this emotional storm together. Social support is an important part of self-care during this time of physical distancing. Virtual postpartum support group meetings available at www. postpartumva.org  Warm Line: 651.256.9873    Breastfeeding Support Groups (virtual)  1st and 3rd Wednesday of each month  2nd and 4th Tuesday of each month    [place] at [email or web address] under the \"About Us\" and \"Classes and Events tabs\"

## 2021-09-30 NOTE — LACTATION NOTE
This note was copied from a baby's chart. Mom continues to pump every 3 hours and plans to exclusively pump. She has a pump at home. Breasts may become engorged when milk \"comes in\". How milk is made / normal phases of milk production, supply and demand discussed. Taught care of engorged breasts - frequent breastfeeding encouraged and breast massage ac. Then nurse the baby (or pump minimally for comfort). Apply cold compresses ac and/or pc x 15 minutes a few times a day for swelling or discomfort. May need to do this care for a couple of days. Discussed prevention and treatment of mastitis.

## 2022-03-18 PROBLEM — Z3A.39 39 WEEKS GESTATION OF PREGNANCY: Status: ACTIVE | Noted: 2020-03-16

## 2022-03-19 PROBLEM — O10.919 CHRONIC HYPERTENSION AFFECTING PREGNANCY: Status: ACTIVE | Noted: 2021-09-28

## 2022-05-29 ENCOUNTER — APPOINTMENT (OUTPATIENT)
Dept: CT IMAGING | Age: 37
End: 2022-05-29
Attending: STUDENT IN AN ORGANIZED HEALTH CARE EDUCATION/TRAINING PROGRAM
Payer: COMMERCIAL

## 2022-05-29 ENCOUNTER — HOSPITAL ENCOUNTER (EMERGENCY)
Age: 37
Discharge: HOME | End: 2022-05-29
Attending: STUDENT IN AN ORGANIZED HEALTH CARE EDUCATION/TRAINING PROGRAM
Payer: COMMERCIAL

## 2022-05-29 VITALS
BODY MASS INDEX: 22.2 KG/M2 | RESPIRATION RATE: 18 BRPM | HEART RATE: 70 BPM | OXYGEN SATURATION: 98 % | TEMPERATURE: 98.4 F | SYSTOLIC BLOOD PRESSURE: 128 MMHG | HEIGHT: 64 IN | WEIGHT: 130 LBS | DIASTOLIC BLOOD PRESSURE: 81 MMHG

## 2022-05-29 DIAGNOSIS — K92.1 BLOODY STOOL: Primary | ICD-10-CM

## 2022-05-29 DIAGNOSIS — R10.84 ABDOMINAL PAIN, GENERALIZED: ICD-10-CM

## 2022-05-29 LAB
ALBUMIN SERPL-MCNC: 3.6 G/DL (ref 3.5–5)
ALBUMIN/GLOB SERPL: 1 {RATIO} (ref 1.1–2.2)
ALP SERPL-CCNC: 64 U/L (ref 45–117)
ALT SERPL-CCNC: 18 U/L (ref 12–78)
ANION GAP SERPL CALC-SCNC: 9 MMOL/L (ref 5–15)
APPEARANCE UR: CLEAR
APTT PPP: 29 SEC (ref 22.1–31)
AST SERPL-CCNC: 20 U/L (ref 15–37)
BACTERIA URNS QL MICRO: ABNORMAL /HPF
BASOPHILS # BLD: 0 K/UL (ref 0–0.1)
BASOPHILS NFR BLD: 1 % (ref 0–1)
BILIRUB SERPL-MCNC: 0.7 MG/DL (ref 0.2–1)
BILIRUB UR QL: NEGATIVE
BUN SERPL-MCNC: 16 MG/DL (ref 6–20)
BUN/CREAT SERPL: 21 (ref 12–20)
CALCIUM SERPL-MCNC: 8.8 MG/DL (ref 8.5–10.1)
CAMPYLOBACTER SPECIES, DNA: NEGATIVE
CAOX CRY URNS QL MICRO: ABNORMAL
CHLORIDE SERPL-SCNC: 108 MMOL/L (ref 97–108)
CO2 SERPL-SCNC: 21 MMOL/L (ref 21–32)
COLOR UR: ABNORMAL
CREAT SERPL-MCNC: 0.78 MG/DL (ref 0.55–1.02)
CRP SERPL-MCNC: 1.64 MG/DL (ref 0–0.6)
DIFFERENTIAL METHOD BLD: ABNORMAL
ENTEROTOXIGEN E COLI, DNA: NEGATIVE
EOSINOPHIL # BLD: 0 K/UL (ref 0–0.4)
EOSINOPHIL NFR BLD: 0 % (ref 0–7)
EPITH CASTS URNS QL MICRO: ABNORMAL /LPF
ERYTHROCYTE [DISTWIDTH] IN BLOOD BY AUTOMATED COUNT: 12 % (ref 11.5–14.5)
ERYTHROCYTE [SEDIMENTATION RATE] IN BLOOD: 6 MM/HR (ref 0–20)
GLOBULIN SER CALC-MCNC: 3.5 G/DL (ref 2–4)
GLUCOSE SERPL-MCNC: 79 MG/DL (ref 65–100)
GLUCOSE UR STRIP.AUTO-MCNC: NEGATIVE MG/DL
HCG UR QL: NEGATIVE
HCT VFR BLD AUTO: 44.6 % (ref 35–47)
HEMOCCULT STL QL: POSITIVE
HGB BLD-MCNC: 15.1 G/DL (ref 11.5–16)
HGB UR QL STRIP: ABNORMAL
IMM GRANULOCYTES # BLD AUTO: 0 K/UL (ref 0–0.04)
IMM GRANULOCYTES NFR BLD AUTO: 1 % (ref 0–0.5)
INR PPP: 1.1 (ref 0.9–1.1)
KETONES UR QL STRIP.AUTO: >80 MG/DL
LEUKOCYTE ESTERASE UR QL STRIP.AUTO: NEGATIVE
LYMPHOCYTES # BLD: 0.6 K/UL (ref 0.8–3.5)
LYMPHOCYTES NFR BLD: 17 % (ref 12–49)
MCH RBC QN AUTO: 32 PG (ref 26–34)
MCHC RBC AUTO-ENTMCNC: 33.9 G/DL (ref 30–36.5)
MCV RBC AUTO: 94.5 FL (ref 80–99)
MONOCYTES # BLD: 0.3 K/UL (ref 0–1)
MONOCYTES NFR BLD: 9 % (ref 5–13)
MUCOUS THREADS URNS QL MICRO: ABNORMAL /LPF
NEUTS SEG # BLD: 2.9 K/UL (ref 1.8–8)
NEUTS SEG NFR BLD: 72 % (ref 32–75)
NITRITE UR QL STRIP.AUTO: NEGATIVE
NRBC # BLD: 0 K/UL (ref 0–0.01)
NRBC BLD-RTO: 0 PER 100 WBC
P SHIGELLOIDES DNA STL QL NAA+PROBE: NEGATIVE
PH UR STRIP: 5.5 [PH] (ref 5–8)
PLATELET # BLD AUTO: 136 K/UL (ref 150–400)
PMV BLD AUTO: 10.5 FL (ref 8.9–12.9)
POTASSIUM SERPL-SCNC: 3.8 MMOL/L (ref 3.5–5.1)
PROT SERPL-MCNC: 7.1 G/DL (ref 6.4–8.2)
PROT UR STRIP-MCNC: 30 MG/DL
PROTHROMBIN TIME: 11.3 SEC (ref 9–11.1)
RBC # BLD AUTO: 4.72 M/UL (ref 3.8–5.2)
RBC #/AREA URNS HPF: ABNORMAL /HPF (ref 0–5)
RBC MORPH BLD: ABNORMAL
SALMONELLA SPECIES, DNA: NEGATIVE
SHIGA TOXIN PRODUCING, DNA: NEGATIVE
SHIGELLA SP+EIEC IPAH STL QL NAA+PROBE: NEGATIVE
SODIUM SERPL-SCNC: 138 MMOL/L (ref 136–145)
SP GR UR REFRACTOMETRY: >1.03
THERAPEUTIC RANGE,PTTT: NORMAL SECS (ref 58–77)
UR CULT HOLD, URHOLD: NORMAL
UROBILINOGEN UR QL STRIP.AUTO: 1 EU/DL (ref 0.2–1)
VIBRIO SPECIES, DNA: NEGATIVE
WBC # BLD AUTO: 3.8 K/UL (ref 3.6–11)
WBC URNS QL MICRO: ABNORMAL /HPF (ref 0–4)
Y. ENTEROCOLITICA, DNA: NEGATIVE

## 2022-05-29 PROCEDURE — 74011250636 HC RX REV CODE- 250/636: Performed by: STUDENT IN AN ORGANIZED HEALTH CARE EDUCATION/TRAINING PROGRAM

## 2022-05-29 PROCEDURE — 36415 COLL VENOUS BLD VENIPUNCTURE: CPT

## 2022-05-29 PROCEDURE — 99285 EMERGENCY DEPT VISIT HI MDM: CPT

## 2022-05-29 PROCEDURE — 81001 URINALYSIS AUTO W/SCOPE: CPT

## 2022-05-29 PROCEDURE — 85025 COMPLETE CBC W/AUTO DIFF WBC: CPT

## 2022-05-29 PROCEDURE — 85610 PROTHROMBIN TIME: CPT

## 2022-05-29 PROCEDURE — 87506 IADNA-DNA/RNA PROBE TQ 6-11: CPT

## 2022-05-29 PROCEDURE — 85652 RBC SED RATE AUTOMATED: CPT

## 2022-05-29 PROCEDURE — 81025 URINE PREGNANCY TEST: CPT

## 2022-05-29 PROCEDURE — 86140 C-REACTIVE PROTEIN: CPT

## 2022-05-29 PROCEDURE — 85730 THROMBOPLASTIN TIME PARTIAL: CPT

## 2022-05-29 PROCEDURE — 74177 CT ABD & PELVIS W/CONTRAST: CPT

## 2022-05-29 PROCEDURE — 82272 OCCULT BLD FECES 1-3 TESTS: CPT

## 2022-05-29 PROCEDURE — 74011000636 HC RX REV CODE- 636: Performed by: RADIOLOGY

## 2022-05-29 PROCEDURE — 96374 THER/PROPH/DIAG INJ IV PUSH: CPT

## 2022-05-29 PROCEDURE — 80053 COMPREHEN METABOLIC PANEL: CPT

## 2022-05-29 RX ORDER — ONDANSETRON 4 MG/1
4 TABLET, FILM COATED ORAL
Qty: 7 TABLET | Refills: 0 | Status: SHIPPED | OUTPATIENT
Start: 2022-05-29

## 2022-05-29 RX ORDER — ONDANSETRON 2 MG/ML
4 INJECTION INTRAMUSCULAR; INTRAVENOUS ONCE
Status: COMPLETED | OUTPATIENT
Start: 2022-05-29 | End: 2022-05-29

## 2022-05-29 RX ORDER — SODIUM CHLORIDE 9 MG/ML
1000 INJECTION, SOLUTION INTRAVENOUS ONCE
Status: COMPLETED | OUTPATIENT
Start: 2022-05-29 | End: 2022-05-29

## 2022-05-29 RX ADMIN — SODIUM CHLORIDE 1000 ML: 9 INJECTION, SOLUTION INTRAVENOUS at 07:40

## 2022-05-29 RX ADMIN — IOPAMIDOL 100 ML: 755 INJECTION, SOLUTION INTRAVENOUS at 08:40

## 2022-05-29 RX ADMIN — ONDANSETRON HYDROCHLORIDE 4 MG: 2 SOLUTION INTRAMUSCULAR; INTRAVENOUS at 07:39

## 2022-05-29 NOTE — ED PROVIDER NOTES
HPI     Patient is a 68-year-old female previously healthy who presents today for nausea and vomiting. Symptoms started several days ago. Patient has associate abdominal pain in the right lower quadrant. She says that she her family recently tested positive for COVID. She says that she is currently on her period. However, she said that she had watery stool that looked maroon color. She also has a history of a hemorrhoid. She was concerned and presented to the ED for further evaluation. She says that she used to be followed by GI but has not seen them for several years. No known history of Crohn's or ulcerative colitis. Past Medical History:   Diagnosis Date    Hypertension        Past Surgical History:   Procedure Laterality Date    HX HERNIA REPAIR      HX OTHER SURGICAL           No family history on file.     Social History     Socioeconomic History    Marital status:      Spouse name: Bianca Gavin Number of children: Not on file    Years of education: Not on file    Highest education level: Not on file   Occupational History    Not on file   Tobacco Use    Smoking status: Never Smoker    Smokeless tobacco: Not on file   Substance and Sexual Activity    Alcohol use: No     Comment: socially    Drug use: No    Sexual activity: Yes     Partners: Male   Other Topics Concern     Service Not Asked    Blood Transfusions Not Asked    Caffeine Concern Not Asked    Occupational Exposure Not Asked    Hobby Hazards Not Asked    Sleep Concern Not Asked    Stress Concern Not Asked    Weight Concern Not Asked    Special Diet Not Asked    Back Care Not Asked    Exercise Not Asked    Bike Helmet Not Asked   [address] Not Asked    Self-Exams Not Asked   Social History Narrative    Not on file     Social Determinants of Health     Financial Resource Strain:     Difficulty of Paying Living Expenses: Not on file   Food Insecurity:     Worried About Running Out of Food in the Last Year: Not on file    Ran Out of Food in the Last Year: Not on file   Transportation Needs:     Lack of Transportation (Medical): Not on file    Lack of Transportation (Non-Medical): Not on file   Physical Activity:     Days of Exercise per Week: Not on file    Minutes of Exercise per Session: Not on file   Stress:     Feeling of Stress : Not on file   Social Connections:     Frequency of Communication with Friends and Family: Not on file    Frequency of Social Gatherings with Friends and Family: Not on file    Attends [employer] Services: Not on file    Active Member of [address] [place] [address] [employer] or Organizations: Not on file    Attends Club or Organization Meetings: Not on file    Marital Status: Not on file   Intimate Partner Violence:     Fear of Current or Ex-Partner: Not on file    Emotionally Abused: Not on file    Physically Abused: Not on file    Sexually Abused: Not on file   Housing Stability:     Unable to Pay for Housing in the Last Year: Not on file    Number of [place] in the Last Year: Not on file    Unstable Housing in the Last Year: Not on file         ALLERGIES: Patient has no known allergies. Review of Systems   Constitutional: Negative for appetite change and fever. HENT: Negative for congestion and rhinorrhea. Eyes: Negative for discharge and redness. Respiratory: Negative for cough and shortness of breath. Cardiovascular: Negative for chest pain. Gastrointestinal: Positive for abdominal pain, blood in stool, diarrhea, nausea and vomiting. Genitourinary: Positive for vaginal bleeding. Negative for decreased urine volume and dysuria. Musculoskeletal: Negative for back pain. Skin: Negative for rash and wound. Neurological: Negative for seizures and headaches. Hematological: Does not bruise/bleed easily. Psychiatric/Behavioral: Negative for agitation. All other systems reviewed and are negative.       Vitals:    05/29/22 0710 05/29/22 0727 05/29/22 0757   BP: 124/82 129/87 128/81   Pulse: 70     Resp: 18     Temp: 98.4 °F (36.9 °C)     SpO2: 98%     Weight: 59 kg (130 lb)     Height: 5' 4\" (1.626 m)              Physical Exam  Vitals and nursing note reviewed. Constitutional:       General: She is not in acute distress. Appearance: Normal appearance. HENT:      Head: Normocephalic and atraumatic. Nose: Nose normal.      Mouth/Throat:      Mouth: Mucous membranes are moist.      Pharynx: Oropharynx is clear. Eyes:      Extraocular Movements: Extraocular movements intact. Conjunctiva/sclera: Conjunctivae normal.      Pupils: Pupils are equal, round, and reactive to light. Cardiovascular:      Rate and Rhythm: Normal rate and regular rhythm. Pulses: Normal pulses. Heart sounds: Normal heart sounds. No murmur heard. No friction rub. No gallop. Pulmonary:      Effort: Pulmonary effort is normal.      Breath sounds: Normal breath sounds. Abdominal:      General: Bowel sounds are normal. There is no distension. Palpations: Abdomen is soft. Tenderness: There is abdominal tenderness. Comments: Right lower quadrant tenderness. Genitourinary:     Rectum: Guaiac result positive. Comments: hemorrhoid  Musculoskeletal:         General: Normal range of motion. Cervical back: Normal range of motion. Skin:     General: Skin is warm and dry. Capillary Refill: Capillary refill takes less than 2 seconds. Neurological:      General: No focal deficit present. Mental Status: She is alert and oriented to person, place, and time. Mental status is at baseline. Psychiatric:         Mood and Affect: Mood normal.          MDM        Patient is a 29-year-old female who presents today with nausea, vomiting, and bloody stools. Symptoms started yesterday. On exam, patient is well-appearing in no acute stress. She has abdominal tenderness to the right lower quadrant. CT scan with no acute process. Labs reviewed.   Patient's H&H is reassuring. Hemoccult is positive. Patient has a hemorrhoid on clinical exam.  Plan I discussed with GI, with plan for outpatient follow-up. Stool culture in process. The patient has been re-evaluated and feeling much better and are stable for discharge. All available radiology and laboratory results have been reviewed with patient and/or available family. Patient and/or family verbally conveyed their understanding and agreement of the patient's signs, symptoms, diagnosis, treatment and prognosis and additionally agree to follow-up as recommended in the discharge instructions or to return to the Emergency Department should their condition change or worsen prior to their follow-up appointment. All questions have been answered and patient and/or available family express understanding. LABORATORY RESULTS:  Labs Reviewed   CBC WITH AUTOMATED DIFF - Abnormal; Notable for the following components:       Result Value    PLATELET 599 (*)     IMMATURE GRANULOCYTES 1 (*)     ABS.  LYMPHOCYTES 0.6 (*)     All other components within normal limits   METABOLIC PANEL, COMPREHENSIVE - Abnormal; Notable for the following components:    BUN/Creatinine ratio 21 (*)     A-G Ratio 1.0 (*)     All other components within normal limits   PROTHROMBIN TIME + INR - Abnormal; Notable for the following components:    Prothrombin time 11.3 (*)     All other components within normal limits   OCCULT BLOOD, STOOL - Abnormal; Notable for the following components:    Occult blood, stool Positive (*)     All other components within normal limits   URINALYSIS W/MICROSCOPIC - Abnormal; Notable for the following components:    Protein 30 (*)     Ketone >80 (*)     Blood MODERATE (*)     Bacteria 1+ (*)     Mucus 2+ (*)     CA Oxalate crystals FEW (*)     All other components within normal limits   C REACTIVE PROTEIN, QT - Abnormal; Notable for the following components:    C-Reactive protein 1.64 (*)     All other components within normal limits   URINE CULTURE HOLD SAMPLE   ENTERIC BACTERIA PANEL, DNA   PTT   SED RATE (ESR)   SAMPLES BEING HELD   HCG URINE, QL. - POC       IMAGING RESULTS:  CT ABD PELV W CONT   Final Result   No evidence of acute process. MEDICATIONS GIVEN:  Medications   0.9% sodium chloride infusion 1,000 mL (0 mL IntraVENous IV Completed 5/29/22 1027)   ondansetron (ZOFRAN) injection 4 mg (4 mg IntraVENous Given 5/29/22 0739)   iopamidoL (ISOVUE-370) 76 % injection 100 mL (100 mL IntraVENous Given 5/29/22 0840)       IMPRESSION:  1. Bloody stool    2. Abdominal pain, generalized        PLAN:  Follow-up Information     Follow up With Specialties Details Why Contact Info    [place] [address] DEP Emergency Medicine Go to  If symptoms worsen [address] [facility] [address] [facility]    Lizzie Gardner MD Cardiovascular Disease Physician Schedule an appointment as soon as possible for a visit in 2 days  [facility]      Haritha Hubbard MD Gastroenterology Schedule an appointment as soon as possible for a visit in 2 days  [address]  [phone]           Discharge Medication List as of 5/29/2022  9:46 AM      START taking these medications    Details   ondansetron hcl (Zofran) 4 mg tablet Take 1 Tablet by mouth every eight (8) hours as needed for Nausea., Normal, Disp-7 Tablet, R-0         CONTINUE these medications which have NOT CHANGED    Details   ibuprofen (MOTRIN) 600 mg tablet Take 1 Tablet by mouth every six (6) hours as needed for Pain., Normal, Disp-40 Tablet, R-0               Shayy Pena MD        Please note that this dictation was completed with [employer], the [employer] ([employer]) voice recognition software. Quite often unanticipated grammatical, syntax, homophones, and other interpretive errors are inadvertently transcribed by the computer software. Please disregard these errors.   Please excuse any errors that have escaped final proofreading.            Procedures

## 2022-05-29 NOTE — ED TRIAGE NOTES
Patient arrives ambulatory from home CC nausea/vomiting that started 3 days ago, pain in lower abdominal area that radiates to right flank    Patient is 8 months postpartum and reports that she has suspected hemorrhoid since then, states she had watery stool since this morning with \"marroon colored blood\"    Patient reports family all tested positive for covid within the last week, patient has not been tested herself

## 2025-04-17 ENCOUNTER — OFFICE VISIT (OUTPATIENT)
Age: 40
End: 2025-04-17

## 2025-04-17 VITALS
DIASTOLIC BLOOD PRESSURE: 98 MMHG | RESPIRATION RATE: 18 BRPM | BODY MASS INDEX: 20.76 KG/M2 | WEIGHT: 121.6 LBS | HEART RATE: 89 BPM | HEIGHT: 64 IN | OXYGEN SATURATION: 98 % | SYSTOLIC BLOOD PRESSURE: 158 MMHG | TEMPERATURE: 98.3 F

## 2025-04-17 DIAGNOSIS — R00.2 PALPITATIONS: Primary | ICD-10-CM

## 2025-04-17 DIAGNOSIS — I10 HYPERTENSION, UNSPECIFIED TYPE: ICD-10-CM

## 2025-04-17 NOTE — PROGRESS NOTES
2025   Rema Frey (: 1985) is a 40 y.o. female, New patient, here for evaluation of the following chief complaint(s):  Hypertension (Pt states today at IV hydration clinic her BP was high x 2 . She also states heart palpitations for 2 weeks worst when lying down. )     ASSESSMENT/PLAN:  Below is the assessment and plan developed based on review of pertinent history, physical exam, labs, studies, and medications.  1. Palpitations  -     EKG 12 lead  -     JOY - Tito Navarro MD, [facility] ([address])  2. Hypertension, unspecified type  -     JOY - Tito Navarro MD, [facility] ([address])    EKG completed in clinic today and shows no acute ischemic process.  Blood pressure is elevated today.  Patient denies current heart palpitations or chest pain. No current complaint of headache, numbness or tingling. Will place referral today to cardiology for evaluation of heart palpitations and hypertension. Patient provided with referral sheet with office number of cardiology office  and advised to call to schedule an appointment. Patient is concerned that no blood pressure medications are being started at this visit. Patient advised that this clinic does not initiate blood pressure medications as basic labs are needed before initiation of medication in order to check renal function, along with close follow ups for dose adjustments. Patient advised that this clinic does not draw routine labs. Patient advised to check blood pressure at home twice daily and record readings and to follow up with PCP as soon as possible. Offered to provide patient with list of primary care providers if needed. Patient reports that she has a PCP. Patient advised to go to the ED for any worsening symptoms (ex. Palpitations, increased blood pressure, chest pain, sob, numbness, tingling) or any new acute symptoms.  Handout given with care instructions.  2. Follow up with PCP as needed.  3. Go to ED